# Patient Record
Sex: MALE | Race: WHITE | Employment: FULL TIME | URBAN - METROPOLITAN AREA
[De-identification: names, ages, dates, MRNs, and addresses within clinical notes are randomized per-mention and may not be internally consistent; named-entity substitution may affect disease eponyms.]

---

## 2017-04-12 ENCOUNTER — EMPLOYEE WELLNESS (OUTPATIENT)
Dept: OTHER | Age: 32
End: 2017-04-12

## 2017-04-12 LAB
CHOLESTEROL, TOTAL: 169 MG/DL (ref 0–199)
GLUCOSE BLD-MCNC: 95 MG/DL (ref 70–99)
HDLC SERPL-MCNC: 48 MG/DL (ref 40–60)
LDL CHOLESTEROL CALCULATED: 104 MG/DL
TRIGL SERPL-MCNC: 87 MG/DL (ref 0–150)

## 2017-09-19 ENCOUNTER — TELEPHONE (OUTPATIENT)
Dept: INTERNAL MEDICINE | Age: 32
End: 2017-09-19

## 2017-09-19 DIAGNOSIS — Z00.00 WELL ADULT EXAM: Primary | ICD-10-CM

## 2017-11-15 ENCOUNTER — TELEPHONE (OUTPATIENT)
Dept: INTERNAL MEDICINE | Age: 32
End: 2017-11-15

## 2017-11-15 DIAGNOSIS — Z12.5 SCREENING PSA (PROSTATE SPECIFIC ANTIGEN): Primary | ICD-10-CM

## 2017-11-15 DIAGNOSIS — Z00.00 WELL ADULT ON ROUTINE HEALTH CHECK: ICD-10-CM

## 2017-11-16 DIAGNOSIS — Z12.5 SCREENING PSA (PROSTATE SPECIFIC ANTIGEN): ICD-10-CM

## 2017-11-16 DIAGNOSIS — Z00.00 WELL ADULT ON ROUTINE HEALTH CHECK: ICD-10-CM

## 2017-11-16 LAB
A/G RATIO: 2 (ref 1.1–2.2)
ALBUMIN SERPL-MCNC: 4.3 G/DL (ref 3.4–5)
ALP BLD-CCNC: 53 U/L (ref 40–129)
ALT SERPL-CCNC: 14 U/L (ref 10–40)
ANION GAP SERPL CALCULATED.3IONS-SCNC: 12 MMOL/L (ref 3–16)
AST SERPL-CCNC: 15 U/L (ref 15–37)
BASOPHILS ABSOLUTE: 0 K/UL (ref 0–0.2)
BASOPHILS RELATIVE PERCENT: 0.4 %
BILIRUB SERPL-MCNC: 0.6 MG/DL (ref 0–1)
BILIRUBIN URINE: NEGATIVE
BLOOD, URINE: NEGATIVE
BUN BLDV-MCNC: 13 MG/DL (ref 7–20)
CALCIUM SERPL-MCNC: 9.5 MG/DL (ref 8.3–10.6)
CHLORIDE BLD-SCNC: 97 MMOL/L (ref 99–110)
CHOLESTEROL, TOTAL: 162 MG/DL (ref 0–199)
CLARITY: CLEAR
CO2: 26 MMOL/L (ref 21–32)
COLOR: YELLOW
CREAT SERPL-MCNC: 0.7 MG/DL (ref 0.9–1.3)
EOSINOPHILS ABSOLUTE: 0.1 K/UL (ref 0–0.6)
EOSINOPHILS RELATIVE PERCENT: 1.4 %
GFR AFRICAN AMERICAN: >60
GFR NON-AFRICAN AMERICAN: >60
GLOBULIN: 2.2 G/DL
GLUCOSE BLD-MCNC: 96 MG/DL (ref 70–99)
GLUCOSE URINE: NEGATIVE MG/DL
HCT VFR BLD CALC: 43.7 % (ref 40.5–52.5)
HDLC SERPL-MCNC: 49 MG/DL (ref 40–60)
HEMOGLOBIN: 14.8 G/DL (ref 13.5–17.5)
KETONES, URINE: NEGATIVE MG/DL
LDL CHOLESTEROL CALCULATED: 97 MG/DL
LEUKOCYTE ESTERASE, URINE: NEGATIVE
LYMPHOCYTES ABSOLUTE: 1.9 K/UL (ref 1–5.1)
LYMPHOCYTES RELATIVE PERCENT: 32.1 %
MCH RBC QN AUTO: 30.5 PG (ref 26–34)
MCHC RBC AUTO-ENTMCNC: 34 G/DL (ref 31–36)
MCV RBC AUTO: 89.8 FL (ref 80–100)
MICROSCOPIC EXAMINATION: NORMAL
MONOCYTES ABSOLUTE: 0.5 K/UL (ref 0–1.3)
MONOCYTES RELATIVE PERCENT: 8.9 %
NEUTROPHILS ABSOLUTE: 3.4 K/UL (ref 1.7–7.7)
NEUTROPHILS RELATIVE PERCENT: 57.2 %
NITRITE, URINE: NEGATIVE
PDW BLD-RTO: 12.8 % (ref 12.4–15.4)
PH UA: 6.5
PLATELET # BLD: 225 K/UL (ref 135–450)
PMV BLD AUTO: 8.9 FL (ref 5–10.5)
POTASSIUM SERPL-SCNC: 4.2 MMOL/L (ref 3.5–5.1)
PROTEIN UA: NEGATIVE MG/DL
RBC # BLD: 4.86 M/UL (ref 4.2–5.9)
SODIUM BLD-SCNC: 135 MMOL/L (ref 136–145)
SPECIFIC GRAVITY UA: 1.01
TOTAL PROTEIN: 6.5 G/DL (ref 6.4–8.2)
TRIGL SERPL-MCNC: 78 MG/DL (ref 0–150)
TSH SERPL DL<=0.05 MIU/L-ACNC: 1.5 UIU/ML (ref 0.27–4.2)
URINE TYPE: NORMAL
UROBILINOGEN, URINE: 0.2 E.U./DL
VLDLC SERPL CALC-MCNC: 16 MG/DL
WBC # BLD: 5.9 K/UL (ref 4–11)

## 2017-12-11 ENCOUNTER — OFFICE VISIT (OUTPATIENT)
Dept: INTERNAL MEDICINE | Age: 32
End: 2017-12-11

## 2017-12-11 VITALS
WEIGHT: 142 LBS | DIASTOLIC BLOOD PRESSURE: 98 MMHG | SYSTOLIC BLOOD PRESSURE: 156 MMHG | HEIGHT: 70 IN | BODY MASS INDEX: 20.33 KG/M2

## 2017-12-11 DIAGNOSIS — G43.009 MIGRAINE WITHOUT AURA AND WITHOUT STATUS MIGRAINOSUS, NOT INTRACTABLE: ICD-10-CM

## 2017-12-11 DIAGNOSIS — Z00.00 PE (PHYSICAL EXAM), ANNUAL: ICD-10-CM

## 2017-12-11 DIAGNOSIS — R03.0 BLOOD PRESSURE ELEVATED WITHOUT HISTORY OF HTN: ICD-10-CM

## 2017-12-11 DIAGNOSIS — Z23 NEED FOR TDAP VACCINATION: Primary | ICD-10-CM

## 2017-12-11 PROCEDURE — 99395 PREV VISIT EST AGE 18-39: CPT | Performed by: INTERNAL MEDICINE

## 2017-12-11 PROCEDURE — 90715 TDAP VACCINE 7 YRS/> IM: CPT | Performed by: INTERNAL MEDICINE

## 2017-12-11 PROCEDURE — 90471 IMMUNIZATION ADMIN: CPT | Performed by: INTERNAL MEDICINE

## 2017-12-11 RX ORDER — SPIRONOLACT/HYDROCHLOROTHIAZID 25 MG-25MG
TABLET ORAL
COMMUNITY
End: 2020-05-29 | Stop reason: CLARIF

## 2017-12-11 RX ORDER — CALCIUM CARBONATE 300MG(750)
TABLET,CHEWABLE ORAL
COMMUNITY
End: 2020-05-29 | Stop reason: CLARIF

## 2017-12-11 ASSESSMENT — PATIENT HEALTH QUESTIONNAIRE - PHQ9
SUM OF ALL RESPONSES TO PHQ9 QUESTIONS 1 & 2: 0
2. FEELING DOWN, DEPRESSED OR HOPELESS: 0
SUM OF ALL RESPONSES TO PHQ QUESTIONS 1-9: 0
1. LITTLE INTEREST OR PLEASURE IN DOING THINGS: 0

## 2018-03-16 ENCOUNTER — EMPLOYEE WELLNESS (OUTPATIENT)
Dept: OTHER | Age: 33
End: 2018-03-16

## 2018-03-16 LAB
CHOLESTEROL, TOTAL: 169 MG/DL (ref 0–199)
GLUCOSE BLD-MCNC: 99 MG/DL (ref 70–99)
HDLC SERPL-MCNC: 55 MG/DL (ref 40–60)
LDL CHOLESTEROL CALCULATED: 104 MG/DL
TRIGL SERPL-MCNC: 51 MG/DL (ref 0–150)

## 2018-03-20 VITALS — BODY MASS INDEX: 20.37 KG/M2 | WEIGHT: 142 LBS

## 2018-03-26 ENCOUNTER — TELEPHONE (OUTPATIENT)
Dept: INTERNAL MEDICINE | Age: 33
End: 2018-03-26

## 2018-03-26 ENCOUNTER — NURSE TRIAGE (OUTPATIENT)
Dept: OTHER | Facility: CLINIC | Age: 33
End: 2018-03-26

## 2018-03-26 NOTE — TELEPHONE ENCOUNTER
Reason for Disposition   [1] MILD-MODERATE pain AND [2] constant AND [3] present > 2 hours    Protocols used: ABDOMINAL PAIN - MALE-ADULT-AH

## 2018-03-27 ENCOUNTER — TELEPHONE (OUTPATIENT)
Dept: INTERNAL MEDICINE | Age: 33
End: 2018-03-27

## 2018-03-28 ENCOUNTER — OFFICE VISIT (OUTPATIENT)
Dept: INTERNAL MEDICINE | Age: 33
End: 2018-03-28

## 2018-03-28 VITALS
HEIGHT: 70 IN | SYSTOLIC BLOOD PRESSURE: 136 MMHG | DIASTOLIC BLOOD PRESSURE: 80 MMHG | WEIGHT: 144 LBS | TEMPERATURE: 98.2 F | BODY MASS INDEX: 20.62 KG/M2

## 2018-03-28 DIAGNOSIS — R10.13 DYSPEPSIA: Primary | ICD-10-CM

## 2018-03-28 DIAGNOSIS — G43.909 MIGRAINE WITHOUT STATUS MIGRAINOSUS, NOT INTRACTABLE, UNSPECIFIED MIGRAINE TYPE: ICD-10-CM

## 2018-03-28 DIAGNOSIS — F41.8 SITUATIONAL ANXIETY: ICD-10-CM

## 2018-03-28 PROCEDURE — 99213 OFFICE O/P EST LOW 20 MIN: CPT | Performed by: INTERNAL MEDICINE

## 2018-03-28 RX ORDER — FAMOTIDINE 20 MG/1
20 TABLET, FILM COATED ORAL 2 TIMES DAILY
Qty: 30 TABLET | Refills: 0 | Status: SHIPPED | OUTPATIENT
Start: 2018-03-28 | End: 2020-03-24

## 2018-03-28 NOTE — PROGRESS NOTES
Follow Up Visit  Established Patient Visit    Patient:  Rocio Conklin                                               : 1985  Age: 33 y.o. MRN: [id number]  Date : 3/28/2018      CHIEF COMPLAINT: Rocio Conklin is a 33 y.o. male who presents for :  Abdominal pain    Abdominal pain: this started on Monday. He was having a migraine so he took ibuprofen 600mg then 400mg. He began having abdominal pain that felt like a burning that started in the epigastric region then started migrating over the abdomen over the course of the day. He took some tums which did not provide much relief. He reports that if he eats pizza, sometimes he wakes up in the middle of the night with a substernal burning sensation. Migraine: he had been following with a specialist that moved from [facility] to [address] [place] [address]. He is asking if there is someone else he can see. Anxiety: the busy season for his work is coming up soon. He has been having trouble concentrating and decreased energy. Chief Complaint   Patient presents with    Headache    Abdominal Pain     and fatigue       Past Medical History:   Diagnosis Date    Blood pressure elevated without history of HTN 2017    Migraine without aura and without status migrainosus, not intractable 2017    New daily persistent headache 10/11/2016       Past Surgical History:   Procedure Laterality Date    HERNIA REPAIR         Current Outpatient Prescriptions   Medication Sig Dispense Refill    Isometheptene-Dichloral-APAP (MIDRIN) -325 MG CAPS Take 1 capsule by mouth See Admin Instructions for 30 days 2 with onset of headache then 1 po q1 hour for Max of 4 /24 hrs.  30 capsule 1    famotidine (PEPCID) 20 MG tablet Take 1 tablet by mouth 2 times daily 30 tablet 0    Magnesium 400 MG TABS Take by mouth      Coenzyme Q10 (CO Q-10) 300 MG CAPS Take by mouth      vitamin D (CHOLECALCIFEROL) 1000 UNIT TABS tablet Take 1,000 Units by mouth daily      cetirizine (ZYRTEC) 10

## 2018-04-02 VITALS — BODY MASS INDEX: 20.23 KG/M2 | WEIGHT: 141 LBS

## 2018-05-14 ENCOUNTER — OFFICE VISIT (OUTPATIENT)
Dept: URGENT CARE | Age: 33
End: 2018-05-14

## 2018-05-14 VITALS
TEMPERATURE: 97.9 F | HEART RATE: 71 BPM | OXYGEN SATURATION: 98 % | HEIGHT: 70 IN | BODY MASS INDEX: 20.76 KG/M2 | WEIGHT: 145 LBS | DIASTOLIC BLOOD PRESSURE: 88 MMHG | RESPIRATION RATE: 14 BRPM | SYSTOLIC BLOOD PRESSURE: 138 MMHG

## 2018-05-14 DIAGNOSIS — H93.11 TINNITUS OF RIGHT EAR: Primary | ICD-10-CM

## 2018-05-14 PROCEDURE — 99213 OFFICE O/P EST LOW 20 MIN: CPT | Performed by: PHYSICIAN ASSISTANT

## 2018-05-17 ASSESSMENT — ENCOUNTER SYMPTOMS
SORE THROAT: 0
NAUSEA: 0
STRIDOR: 0
SINUS PAIN: 0
VOMITING: 0
COUGH: 0

## 2019-03-19 ENCOUNTER — TELEPHONE (OUTPATIENT)
Dept: INTERNAL MEDICINE CLINIC | Age: 34
End: 2019-03-19

## 2019-03-19 DIAGNOSIS — Z00.00 WELL ADULT EXAM: Primary | ICD-10-CM

## 2019-03-19 DIAGNOSIS — R03.0 BLOOD PRESSURE ELEVATED WITHOUT HISTORY OF HTN: ICD-10-CM

## 2019-03-21 LAB
A/G RATIO: 2.2 (ref 1.1–2.2)
ALBUMIN SERPL-MCNC: 4.6 G/DL (ref 3.4–5)
ALP BLD-CCNC: 55 U/L (ref 40–129)
ALT SERPL-CCNC: 17 U/L (ref 10–40)
ANION GAP SERPL CALCULATED.3IONS-SCNC: 13 MMOL/L (ref 3–16)
AST SERPL-CCNC: 15 U/L (ref 15–37)
BASOPHILS ABSOLUTE: 0 K/UL (ref 0–0.2)
BASOPHILS RELATIVE PERCENT: 0.5 %
BILIRUB SERPL-MCNC: 0.5 MG/DL (ref 0–1)
BUN BLDV-MCNC: 14 MG/DL (ref 7–20)
CALCIUM SERPL-MCNC: 9.8 MG/DL (ref 8.3–10.6)
CHLORIDE BLD-SCNC: 97 MMOL/L (ref 99–110)
CHOLESTEROL, TOTAL: 161 MG/DL (ref 0–199)
CO2: 26 MMOL/L (ref 21–32)
CREAT SERPL-MCNC: 0.9 MG/DL (ref 0.9–1.3)
EOSINOPHILS ABSOLUTE: 0.1 K/UL (ref 0–0.6)
EOSINOPHILS RELATIVE PERCENT: 2.6 %
GFR AFRICAN AMERICAN: >60
GFR NON-AFRICAN AMERICAN: >60
GLOBULIN: 2.1 G/DL
GLUCOSE BLD-MCNC: 96 MG/DL (ref 70–99)
HCT VFR BLD CALC: 45.1 % (ref 40.5–52.5)
HDLC SERPL-MCNC: 49 MG/DL (ref 40–60)
HEMOGLOBIN: 14.9 G/DL (ref 13.5–17.5)
LDL CHOLESTEROL CALCULATED: 99 MG/DL
LYMPHOCYTES ABSOLUTE: 1.5 K/UL (ref 1–5.1)
LYMPHOCYTES RELATIVE PERCENT: 30 %
MCH RBC QN AUTO: 29.7 PG (ref 26–34)
MCHC RBC AUTO-ENTMCNC: 33 G/DL (ref 31–36)
MCV RBC AUTO: 89.8 FL (ref 80–100)
MONOCYTES ABSOLUTE: 0.5 K/UL (ref 0–1.3)
MONOCYTES RELATIVE PERCENT: 9.8 %
NEUTROPHILS ABSOLUTE: 2.9 K/UL (ref 1.7–7.7)
NEUTROPHILS RELATIVE PERCENT: 57.1 %
PDW BLD-RTO: 13.1 % (ref 12.4–15.4)
PLATELET # BLD: 260 K/UL (ref 135–450)
PMV BLD AUTO: 9.2 FL (ref 5–10.5)
POTASSIUM SERPL-SCNC: 4.6 MMOL/L (ref 3.5–5.1)
RBC # BLD: 5.02 M/UL (ref 4.2–5.9)
SODIUM BLD-SCNC: 136 MMOL/L (ref 136–145)
TOTAL PROTEIN: 6.7 G/DL (ref 6.4–8.2)
TRIGL SERPL-MCNC: 66 MG/DL (ref 0–150)
TSH REFLEX: 1.74 UIU/ML (ref 0.27–4.2)
VLDLC SERPL CALC-MCNC: 13 MG/DL
WBC # BLD: 5.1 K/UL (ref 4–11)

## 2019-03-25 ENCOUNTER — OFFICE VISIT (OUTPATIENT)
Dept: INTERNAL MEDICINE CLINIC | Age: 34
End: 2019-03-25
Payer: COMMERCIAL

## 2019-03-25 VITALS
DIASTOLIC BLOOD PRESSURE: 78 MMHG | BODY MASS INDEX: 20.04 KG/M2 | HEIGHT: 70 IN | WEIGHT: 140 LBS | SYSTOLIC BLOOD PRESSURE: 136 MMHG

## 2019-03-25 DIAGNOSIS — Z00.00 PE (PHYSICAL EXAM), ANNUAL: Primary | ICD-10-CM

## 2019-03-25 DIAGNOSIS — G43.009 MIGRAINE WITHOUT AURA AND WITHOUT STATUS MIGRAINOSUS, NOT INTRACTABLE: ICD-10-CM

## 2019-03-25 PROCEDURE — 99385 PREV VISIT NEW AGE 18-39: CPT | Performed by: INTERNAL MEDICINE

## 2019-03-25 ASSESSMENT — PATIENT HEALTH QUESTIONNAIRE - PHQ9
1. LITTLE INTEREST OR PLEASURE IN DOING THINGS: 0
SUM OF ALL RESPONSES TO PHQ QUESTIONS 1-9: 0
SUM OF ALL RESPONSES TO PHQ QUESTIONS 1-9: 0
2. FEELING DOWN, DEPRESSED OR HOPELESS: 0
SUM OF ALL RESPONSES TO PHQ9 QUESTIONS 1 & 2: 0

## 2019-04-10 ENCOUNTER — EMPLOYEE WELLNESS (OUTPATIENT)
Dept: OTHER | Age: 34
End: 2019-04-10

## 2019-04-10 LAB
CHOLESTEROL, TOTAL: 150 MG/DL (ref 0–199)
GLUCOSE BLD-MCNC: 90 MG/DL (ref 70–99)
HDLC SERPL-MCNC: 48 MG/DL (ref 40–60)
LDL CHOLESTEROL CALCULATED: 91 MG/DL
TRIGL SERPL-MCNC: 56 MG/DL (ref 0–150)

## 2019-04-15 VITALS — BODY MASS INDEX: 19.8 KG/M2 | WEIGHT: 138 LBS

## 2019-04-24 PROBLEM — Z00.00 PE (PHYSICAL EXAM), ANNUAL: Status: RESOLVED | Noted: 2019-03-25 | Resolved: 2019-04-24

## 2020-02-10 ENCOUNTER — TELEPHONE (OUTPATIENT)
Dept: INTERNAL MEDICINE CLINIC | Age: 35
End: 2020-02-10

## 2020-02-10 NOTE — TELEPHONE ENCOUNTER
Non-Urgent Medical Question     From  Jude Johnson To  [facility] 111 Practice Support Sent  2/10/2020  2:02 PM   Hi Dr. Gloria Farooq all is well. Can you please recommend a dermatologist in the [address]? I would like to just have a general body scan.      Thanks,     Eliane Dasilva

## 2020-03-12 ENCOUNTER — OFFICE VISIT (OUTPATIENT)
Dept: DERMATOLOGY | Age: 35
End: 2020-03-12
Payer: COMMERCIAL

## 2020-03-12 PROBLEM — D22.9 MULTIPLE BENIGN MELANOCYTIC NEVI: Status: ACTIVE | Noted: 2020-03-12

## 2020-03-12 PROCEDURE — 99202 OFFICE O/P NEW SF 15 MIN: CPT | Performed by: DERMATOLOGY

## 2020-03-12 NOTE — PROGRESS NOTES
[address] [facility], [facility]) Physicians    Previous clinic visit:  none    CC:  mole check    HPI:    1.) Here today for fbse. Denies new, changing, or sx nevi. No personal or family h/o skin cancers or atypical nevi. Here for baseline skin cancer screening and to establish care. DERM HISTORY:   Personal history of NMSC or MM- no    Family history of NMSC or MM- no   Sunburns easily- Yes   Uses sunscreen- Yes  History of tanning bed use- No    ADDITIONAL HISTORY:    I have reviewed past medical and surgical histories, current medications, allergies, social and family histories as documented in the patient's electronic medical record. Current Outpatient Medications   Medication Sig Dispense Refill    famotidine (PEPCID) 20 MG tablet Take 1 tablet by mouth 2 times daily 30 tablet 0    Magnesium 400 MG TABS Take by mouth      Coenzyme Q10 (CO Q-10) 300 MG CAPS Take by mouth      vitamin D (CHOLECALCIFEROL) 1000 UNIT TABS tablet Take 1,000 Units by mouth daily      cetirizine (ZYRTEC) 10 MG tablet Take 10 mg by mouth daily       No current facility-administered medications for this visit.         ROS:    General: No fevers, chills, sweats, unexplained weight loss or weight gain, fatigue, malaise   Skin: Denies additional lesions    Heme: No history of bleeding diatheses    Allergy: Denies seasonal or environmental allergies or other medication allergies     PHYSICAL EXAM:    General: Well-appearing, NAD    Neurologic/psychiatric: Patient is AOx3; mood and affect are appropriate and congruent  Eyes: conjunctivae and eyelids without erythema, injection, or discharge   Integument: Examination was performed of the following and were unremarkable except as otherwise noted below:scalp, hair, face, ears, mucosa of gums/teeth/cutaneous and mucosal lips, buccal mucosa, oropharynx, neck, breast/axilla/chest, abdomen, groin, back, buttocks, RUE, LUE, RLE, LLE, digits/nails, apocrine/eccrine glands; genital exam

## 2020-03-12 NOTE — PATIENT INSTRUCTIONS
Please email me through [employer] or call office if any spot or mole changes, defined as becoming larger, darker, multi-colored, or becomes symptomatic (bleeding, tender, etc). Please be mindful of your sun protection strategies, including use of broad spectrum sunscreen with SPF of at least 30, sun guard clothing with UPF (available at most [employer]), broad brimmed hat, sunglasses, and sun avoidance during peak hours of the day. ABCDE's of melanoma to take note of:     ASYMMETRY OF MOLE,   BORDER STRANGE,   COLOR CHANGING OR BECOMING DARKER,   DIAMETER ENLARGING,   EVOLUTION (EXISTING MOLE IS CHANGING WITH ANY OF THE AFOREMENTIONED CHANGES, MOLE IS BLEEDING OR TENDER, OR NEW MOLES DEVELOPING SUDDENLY)    Also, please be sure to see dentist twice yearly and ensure someone (whether it's me,  your PCP, or gynecologist) is looking at genitals once yearly. Melanoma and other skin cancers can occur anywhere!

## 2020-03-12 NOTE — LETTER
[facility]  [address] [address]  Phone: [phone]  Fax: [phone]    González Rueda MD        March 12, 2020     Daquan Rock MD  [address] [address]    Patient: Vanessa Stockton  MR Number: <[id number]>  YOB: 1985  Date of Visit: 3/12/2020    Dear Dr. Daquan Rock: Thank you for the request for consultation for Vanessa Stockton to me for the evaluation of moles. Below are the relevant portions of my assessment and plan of care. If you have questions, please do not hesitate to call me. I look forward to following Christine Kath along with you.     Sincerely,        González Rueda MD

## 2020-03-23 ENCOUNTER — HOSPITAL ENCOUNTER (EMERGENCY)
Age: 35
Discharge: HOME | End: 2020-03-24
Attending: EMERGENCY MEDICINE
Payer: COMMERCIAL

## 2020-03-23 ENCOUNTER — APPOINTMENT (OUTPATIENT)
Dept: CT IMAGING | Age: 35
End: 2020-03-23
Payer: COMMERCIAL

## 2020-03-23 LAB
ANION GAP SERPL CALCULATED.3IONS-SCNC: 17 MMOL/L (ref 3–16)
BASOPHILS ABSOLUTE: 0 K/UL (ref 0–0.2)
BASOPHILS RELATIVE PERCENT: 0.4 %
BUN BLDV-MCNC: 13 MG/DL (ref 7–20)
CALCIUM SERPL-MCNC: 10 MG/DL (ref 8.3–10.6)
CHLORIDE BLD-SCNC: 100 MMOL/L (ref 99–110)
CO2: 24 MMOL/L (ref 21–32)
CREAT SERPL-MCNC: 0.7 MG/DL (ref 0.9–1.3)
EOSINOPHILS ABSOLUTE: 0 K/UL (ref 0–0.6)
EOSINOPHILS RELATIVE PERCENT: 0.4 %
GFR AFRICAN AMERICAN: >60
GFR NON-AFRICAN AMERICAN: >60
GLUCOSE BLD-MCNC: 110 MG/DL (ref 70–99)
HCT VFR BLD CALC: 46.8 % (ref 40.5–52.5)
HEMOGLOBIN: 16 G/DL (ref 13.5–17.5)
LYMPHOCYTES ABSOLUTE: 1.6 K/UL (ref 1–5.1)
LYMPHOCYTES RELATIVE PERCENT: 12.7 %
MCH RBC QN AUTO: 29.9 PG (ref 26–34)
MCHC RBC AUTO-ENTMCNC: 34.2 G/DL (ref 31–36)
MCV RBC AUTO: 87.3 FL (ref 80–100)
MONOCYTES ABSOLUTE: 0.9 K/UL (ref 0–1.3)
MONOCYTES RELATIVE PERCENT: 7.1 %
NEUTROPHILS ABSOLUTE: 9.8 K/UL (ref 1.7–7.7)
NEUTROPHILS RELATIVE PERCENT: 79.4 %
PDW BLD-RTO: 13 % (ref 12.4–15.4)
PLATELET # BLD: 300 K/UL (ref 135–450)
PMV BLD AUTO: 7.9 FL (ref 5–10.5)
POTASSIUM SERPL-SCNC: 3.9 MMOL/L (ref 3.5–5.1)
RBC # BLD: 5.36 M/UL (ref 4.2–5.9)
SODIUM BLD-SCNC: 141 MMOL/L (ref 136–145)
WBC # BLD: 12.3 K/UL (ref 4–11)

## 2020-03-23 PROCEDURE — 70491 CT SOFT TISSUE NECK W/DYE: CPT

## 2020-03-23 PROCEDURE — 85025 COMPLETE CBC W/AUTO DIFF WBC: CPT

## 2020-03-23 PROCEDURE — 2500000003 HC RX 250 WO HCPCS: Performed by: EMERGENCY MEDICINE

## 2020-03-23 PROCEDURE — 2580000003 HC RX 258: Performed by: EMERGENCY MEDICINE

## 2020-03-23 PROCEDURE — 80048 BASIC METABOLIC PNL TOTAL CA: CPT

## 2020-03-23 PROCEDURE — 99284 EMERGENCY DEPT VISIT MOD MDM: CPT

## 2020-03-23 PROCEDURE — 6360000004 HC RX CONTRAST MEDICATION: Performed by: EMERGENCY MEDICINE

## 2020-03-23 PROCEDURE — 88305 TISSUE EXAM BY PATHOLOGIST: CPT

## 2020-03-23 RX ORDER — 0.9 % SODIUM CHLORIDE 0.9 %
1000 INTRAVENOUS SOLUTION INTRAVENOUS ONCE
Status: COMPLETED | OUTPATIENT
Start: 2020-03-23 | End: 2020-03-23

## 2020-03-23 RX ADMIN — IOPAMIDOL 80 ML: 755 INJECTION, SOLUTION INTRAVENOUS at 21:48

## 2020-03-23 RX ADMIN — GLUCAGON HYDROCHLORIDE 1 MG: KIT at 21:57

## 2020-03-23 RX ADMIN — SODIUM CHLORIDE 1000 ML: 9 INJECTION, SOLUTION INTRAVENOUS at 21:25

## 2020-03-23 ASSESSMENT — PAIN DESCRIPTION - LOCATION: LOCATION: THROAT

## 2020-03-23 ASSESSMENT — PAIN SCALES - GENERAL: PAINLEVEL_OUTOF10: 6

## 2020-03-23 ASSESSMENT — PAIN DESCRIPTION - PAIN TYPE: TYPE: ACUTE PAIN

## 2020-03-24 ENCOUNTER — ANESTHESIA (OUTPATIENT)
Dept: ENDOSCOPY | Age: 35
End: 2020-03-24
Payer: COMMERCIAL

## 2020-03-24 ENCOUNTER — ANESTHESIA EVENT (OUTPATIENT)
Dept: ENDOSCOPY | Age: 35
End: 2020-03-24
Payer: COMMERCIAL

## 2020-03-24 VITALS — SYSTOLIC BLOOD PRESSURE: 99 MMHG | OXYGEN SATURATION: 97 % | DIASTOLIC BLOOD PRESSURE: 53 MMHG

## 2020-03-24 VITALS
RESPIRATION RATE: 14 BRPM | HEART RATE: 75 BPM | HEIGHT: 71 IN | OXYGEN SATURATION: 99 % | TEMPERATURE: 98.5 F | DIASTOLIC BLOOD PRESSURE: 77 MMHG | BODY MASS INDEX: 19.63 KG/M2 | SYSTOLIC BLOOD PRESSURE: 125 MMHG | WEIGHT: 140.2 LBS

## 2020-03-24 LAB — MAGNESIUM: 2.2 MG/DL (ref 1.8–2.4)

## 2020-03-24 PROCEDURE — 83735 ASSAY OF MAGNESIUM: CPT

## 2020-03-24 PROCEDURE — 7100000000 HC PACU RECOVERY - FIRST 15 MIN: Performed by: INTERNAL MEDICINE

## 2020-03-24 PROCEDURE — 3609017700 HC EGD DILATION GASTRIC/DUODENAL STRICTURE: Performed by: INTERNAL MEDICINE

## 2020-03-24 PROCEDURE — 6360000002 HC RX W HCPCS: Performed by: ANESTHESIOLOGY

## 2020-03-24 PROCEDURE — 2580000003 HC RX 258: Performed by: ANESTHESIOLOGY

## 2020-03-24 PROCEDURE — 3700000001 HC ADD 15 MINUTES (ANESTHESIA): Performed by: INTERNAL MEDICINE

## 2020-03-24 PROCEDURE — C1726 CATH, BAL DIL, NON-VASCULAR: HCPCS | Performed by: INTERNAL MEDICINE

## 2020-03-24 PROCEDURE — 2709999900 HC NON-CHARGEABLE SUPPLY: Performed by: INTERNAL MEDICINE

## 2020-03-24 PROCEDURE — 7100000001 HC PACU RECOVERY - ADDTL 15 MIN: Performed by: INTERNAL MEDICINE

## 2020-03-24 PROCEDURE — 3609012400 HC EGD TRANSORAL BIOPSY SINGLE/MULTIPLE: Performed by: INTERNAL MEDICINE

## 2020-03-24 PROCEDURE — 2500000003 HC RX 250 WO HCPCS: Performed by: ANESTHESIOLOGY

## 2020-03-24 PROCEDURE — 3700000000 HC ANESTHESIA ATTENDED CARE: Performed by: INTERNAL MEDICINE

## 2020-03-24 RX ORDER — 0.9 % SODIUM CHLORIDE 0.9 %
500 INTRAVENOUS SOLUTION INTRAVENOUS
Status: DISCONTINUED | OUTPATIENT
Start: 2020-03-24 | End: 2020-03-24 | Stop reason: HOSPADM

## 2020-03-24 RX ORDER — MEPERIDINE HYDROCHLORIDE 25 MG/ML
12.5 INJECTION INTRAMUSCULAR; INTRAVENOUS; SUBCUTANEOUS EVERY 5 MIN PRN
Status: DISCONTINUED | OUTPATIENT
Start: 2020-03-24 | End: 2020-03-24 | Stop reason: HOSPADM

## 2020-03-24 RX ORDER — ONDANSETRON 2 MG/ML
4 INJECTION INTRAMUSCULAR; INTRAVENOUS
Status: DISCONTINUED | OUTPATIENT
Start: 2020-03-24 | End: 2020-03-24 | Stop reason: HOSPADM

## 2020-03-24 RX ORDER — SUCCINYLCHOLINE CHLORIDE 20 MG/ML
INJECTION INTRAMUSCULAR; INTRAVENOUS PRN
Status: DISCONTINUED | OUTPATIENT
Start: 2020-03-24 | End: 2020-03-24 | Stop reason: SDUPTHER

## 2020-03-24 RX ORDER — SODIUM CHLORIDE, SODIUM LACTATE, POTASSIUM CHLORIDE, CALCIUM CHLORIDE 600; 310; 30; 20 MG/100ML; MG/100ML; MG/100ML; MG/100ML
INJECTION, SOLUTION INTRAVENOUS CONTINUOUS PRN
Status: DISCONTINUED | OUTPATIENT
Start: 2020-03-24 | End: 2020-03-24 | Stop reason: SDUPTHER

## 2020-03-24 RX ORDER — HYDROCODONE BITARTRATE AND ACETAMINOPHEN 5; 325 MG/1; MG/1
1 TABLET ORAL
Status: DISCONTINUED | OUTPATIENT
Start: 2020-03-24 | End: 2020-03-24 | Stop reason: HOSPADM

## 2020-03-24 RX ORDER — LIDOCAINE HYDROCHLORIDE 20 MG/ML
INJECTION, SOLUTION EPIDURAL; INFILTRATION; INTRACAUDAL; PERINEURAL PRN
Status: DISCONTINUED | OUTPATIENT
Start: 2020-03-24 | End: 2020-03-24 | Stop reason: SDUPTHER

## 2020-03-24 RX ORDER — PROPOFOL 10 MG/ML
INJECTION, EMULSION INTRAVENOUS PRN
Status: DISCONTINUED | OUTPATIENT
Start: 2020-03-24 | End: 2020-03-24 | Stop reason: SDUPTHER

## 2020-03-24 RX ORDER — HYDRALAZINE HYDROCHLORIDE 20 MG/ML
5 INJECTION INTRAMUSCULAR; INTRAVENOUS EVERY 10 MIN PRN
Status: DISCONTINUED | OUTPATIENT
Start: 2020-03-24 | End: 2020-03-24 | Stop reason: HOSPADM

## 2020-03-24 RX ORDER — PROCHLORPERAZINE EDISYLATE 5 MG/ML
5 INJECTION INTRAMUSCULAR; INTRAVENOUS
Status: DISCONTINUED | OUTPATIENT
Start: 2020-03-24 | End: 2020-03-24 | Stop reason: HOSPADM

## 2020-03-24 RX ORDER — OMEPRAZOLE 20 MG/1
20 CAPSULE, DELAYED RELEASE ORAL
Qty: 90 CAPSULE | Refills: 1 | Status: SHIPPED | OUTPATIENT
Start: 2020-03-24 | End: 2020-07-15 | Stop reason: CLARIF

## 2020-03-24 RX ORDER — DIPHENHYDRAMINE HYDROCHLORIDE 50 MG/ML
12.5 INJECTION INTRAMUSCULAR; INTRAVENOUS
Status: DISCONTINUED | OUTPATIENT
Start: 2020-03-24 | End: 2020-03-24 | Stop reason: HOSPADM

## 2020-03-24 RX ADMIN — SODIUM CHLORIDE, SODIUM LACTATE, POTASSIUM CHLORIDE, AND CALCIUM CHLORIDE: 600; 310; 30; 20 INJECTION, SOLUTION INTRAVENOUS at 02:25

## 2020-03-24 RX ADMIN — SUCCINYLCHOLINE CHLORIDE 140 MG: 20 INJECTION, SOLUTION INTRAMUSCULAR; INTRAVENOUS; PARENTERAL at 02:34

## 2020-03-24 RX ADMIN — LIDOCAINE HYDROCHLORIDE 40 MG: 20 INJECTION, SOLUTION EPIDURAL; INFILTRATION; INTRACAUDAL; PERINEURAL at 02:34

## 2020-03-24 RX ADMIN — PROPOFOL 200 MG: 10 INJECTION, EMULSION INTRAVENOUS at 02:34

## 2020-03-24 ASSESSMENT — PULMONARY FUNCTION TESTS
PIF_VALUE: 0
PIF_VALUE: 0
PIF_VALUE: 4
PIF_VALUE: 0
PIF_VALUE: 0
PIF_VALUE: 4
PIF_VALUE: 8
PIF_VALUE: 0
PIF_VALUE: 3
PIF_VALUE: 2
PIF_VALUE: 2
PIF_VALUE: 3
PIF_VALUE: 0
PIF_VALUE: 1
PIF_VALUE: 17
PIF_VALUE: 18
PIF_VALUE: 0
PIF_VALUE: 3
PIF_VALUE: 0
PIF_VALUE: 1
PIF_VALUE: 0
PIF_VALUE: 2
PIF_VALUE: 4
PIF_VALUE: 4
PIF_VALUE: 3
PIF_VALUE: 4
PIF_VALUE: 0
PIF_VALUE: 1
PIF_VALUE: 0

## 2020-03-24 ASSESSMENT — ENCOUNTER SYMPTOMS
EYE PAIN: 0
SHORTNESS OF BREATH: 0
DIARRHEA: 0
TROUBLE SWALLOWING: 1
ABDOMINAL PAIN: 0
VOMITING: 0
NAUSEA: 0
WHEEZING: 0
COUGH: 0

## 2020-03-24 ASSESSMENT — PAIN SCALES - GENERAL: PAINLEVEL_OUTOF10: 0

## 2020-03-24 ASSESSMENT — LIFESTYLE VARIABLES: SMOKING_STATUS: 0

## 2020-03-24 NOTE — ED PROVIDER NOTES
[address] ENCOUNTER          ATTENDING PHYSICIAN NOTE       Date of evaluation: 3/23/2020    Chief Complaint     Other (Difficulty swallowing) and Pharyngitis      History of Present Illness     Nena Hernández is a 36 y.o. male who presents with a chief complaint of difficulty swallowing. The patient states that this morning he took his medications which included a Q Q10 vitamin and magnesium tablet and shortly thereafter felt like something was lodged in his throat and has been unable to swallow since that time. He cannot get down any liquids or even his saliva. Has had similar symptoms in the past but usually they resolve after an hour or so. He had a smoothie prior to this and it went down without any difficulty. No abdominal pain. No difficult phonation. Was initially seen at [facility] emergency department and there was concern for esophageal foreign body. They reportedly talk to GI about the patient who agreed to see him here. Review of Systems     Review of Systems   Constitutional: Negative for chills and fever. HENT: Positive for trouble swallowing. Negative for congestion. Eyes: Negative for pain. Respiratory: Negative for cough, shortness of breath and wheezing. Cardiovascular: Negative for chest pain and leg swelling. Gastrointestinal: Negative for abdominal pain, diarrhea, nausea and vomiting. Genitourinary: Negative for dysuria. Musculoskeletal: Negative for arthralgias. Skin: Negative for rash. Neurological: Negative for weakness and headaches. All other systems reviewed and are negative.       Past Medical, Surgical, Family, and Social History         Diagnosis Date    Blood pressure elevated without history of HTN 12/11/2017    Migraine without aura and without status migrainosus, not intractable 12/11/2017    New daily persistent headache 10/11/2016         Procedure Laterality Date    HERNIA REPAIR       His family history includes Cancer in his maternal grandfather, maternal grandmother, paternal grandfather, paternal grandmother, and paternal uncle; Depression in his mother; Diabetes in an other family member. He reports that he has never smoked. He has never used smokeless tobacco. He reports current alcohol use of about 5.0 standard drinks of alcohol per week. He reports that he does not use drugs. Medications     Previous Medications    CETIRIZINE (ZYRTEC) 10 MG TABLET    Take 10 mg by mouth daily    COENZYME Q10 (CO Q-10) 300 MG CAPS    Take by mouth    MAGNESIUM 400 MG TABS    Take by mouth    VITAMIN D (CHOLECALCIFEROL) 1000 UNIT TABS TABLET    Take 1,000 Units by mouth daily       Allergies     He has No Known Allergies. Physical Exam     ED Triage Vitals [03/23/20 2044]   Enc Vitals Group      BP (!) 145/84      Pulse 77      Resp 16      Temp 98 °F (36.7 °C)      Temp Source Oral      SpO2 98 %      Weight 140 lb 3.2 oz (63.6 kg)      Height 5' 11\" (1.803 m)      Head Circumference       Peak Flow       Pain Score       Pain Loc       Pain Edu? Excl. in [address]? General:  Non-toxic, no acute distress, fully cooperative with my exam    HEENT:  NCAT, drooling into a bag, normal phonation    Neck:  Supple    Pulmonary:   No increased work of breathing; CTAB    Cardiac:  RRR, no murmur, thrill or gallop. Capillary refill <3s. 2+ distal pulses    Abdomen:  Soft, nontender , nondistended; no focal rebound or guarding     Musculoskeletal:  Grossly intact without obvious injury or deformity     Neuro:  No focal motor deficits    Skin: No rashes      Diagnostic Results     RADIOLOGY:  CT SOFT TISSUE NECK W CONTRAST   Final Result      1. No sign of any mass in the airway. Vocal cords thyroid and nasopharynx and oropharynx all appear within normal limits. 2. Air-fluid level and fluid distending the esophagus from the thoracic inlet down to the level of the aortic arch.  This could reflect reflux disease throughout

## 2020-03-24 NOTE — PROGRESS NOTES
Report from ENDO    Plan per Dr. Gonzalez  note:    check mg in er  start prilosec otc 20 mg daily  call office 1 week for biopsy results and further  recommendations/further dilation/ etc pending biopsy  results  soft diet until future follow up

## 2020-03-24 NOTE — ED NOTES
Report to Endo RN for procedure, patient was transported to Endo by RN. Consent to be signed prior to procedure.       Franky Piña RN  03/24/20 0222

## 2020-03-24 NOTE — H&P
[address] [facility]  GI Consult Note    Patient: Esthela Jacob  : 1985  Acct#:      Date:  3/24/2020    Subjective:       History of Present Illness  Patient is a 36 y.o.  male whom we are consulted emergently to er for unable tolerate secretions/dysphagia. Has had recent cold. Then sore throat this am. Drank smoothie no problem. Then took am meds and couldn't swallow rest of day. Intermittent sx over time. On pepcid at home. Ct neck in er fluid. Er attending called me stating pt can't tolerate his own secretions. Past Medical History:   Diagnosis Date    Blood pressure elevated without history of HTN 2017    Migraine without aura and without status migrainosus, not intractable 2017    New daily persistent headache 10/11/2016      Past Surgical History:   Procedure Laterality Date    HERNIA REPAIR          Admission Meds  No current facility-administered medications on file prior to encounter. Current Outpatient Medications on File Prior to Encounter   Medication Sig Dispense Refill    famotidine (PEPCID) 20 MG tablet Take 1 tablet by mouth 2 times daily 30 tablet 0    Magnesium 400 MG TABS Take by mouth      Coenzyme Q10 (CO Q-10) 300 MG CAPS Take by mouth      vitamin D (CHOLECALCIFEROL) 1000 UNIT TABS tablet Take 1,000 Units by mouth daily      cetirizine (ZYRTEC) 10 MG tablet Take 10 mg by mouth daily           Allergies  No Known Allergies     Family history     Family History   Problem Relation Age of Onset    Depression Mother     Cancer Paternal Uncle     Cancer Maternal Grandmother     Cancer Maternal Grandfather     Cancer Paternal Grandmother     Cancer Paternal Grandfather     Diabetes Other         Social   Social History     Tobacco Use    Smoking status: Never Smoker    Smokeless tobacco: Never Used   Substance Use Topics    Alcohol use:  Yes     Alcohol/week: 5.0 standard drinks     Types: 5 Cans of beer per week     Comment: 2 drinks daily

## 2020-03-24 NOTE — ED PROVIDER NOTES
[facility] Emergency Department      Pt Name: Edison Gallo  MRN: [id number]  Comfortgffay 1985  Date of evaluation: 3/23/2020  Provider: Jennifer Saenz MD  CHIEF COMPLAINT  Chief Complaint   Patient presents with    Other     Difficulty swallowing    Pharyngitis     HPI  Edison Gallo is a 36 y.o. male who presents because of difficulty swallowing. He says he noticed it this morning shortly after he drank a smoothie. He did not choke on anything but says it got worse after he took his vitamin supplement. He has had problems swallowing in the past but says it typically only last for about 10 minutes and the maximum it has lasted has been an hour previously. He has never seen a doctor because of this problem. Throughout the day, he continues to have difficulty. He is not able to swallow his own saliva. He contacted his doctor who told him to try some Pepcid. He says that he chewed up the pill and swallowed it but shortly afterwards, spit up more saliva. He denies any chest pain. He does have pain in his throat when he swallows. He has some mild shortness of breath but says that he thinks it is because of his anxiety about his current condition and concern for becoming dehydrated. REVIEW OF SYSTEMS:  No fever, no cough, no chest pain, some abdominal soreness from spitting up frequently Pertinent positives and negatives as per the HPI. All other review of systems reviewed and negative. Nursing notes reviewed. PAST MEDICAL HISTORY  Past Medical History:   Diagnosis Date    Blood pressure elevated without history of HTN 2017    Migraine without aura and without status migrainosus, not intractable 2017    New daily persistent headache 10/11/2016     SURGICAL HISTORY  Past Surgical History:   Procedure Laterality Date    HERNIA REPAIR       MEDICATIONS:  No current facility-administered medications on file prior to encounter.       Current Outpatient Medications on File Prior to Encounter   Medication Sig Dispense Refill    famotidine (PEPCID) 20 MG tablet Take 1 tablet by mouth 2 times daily 30 tablet 0    Magnesium 400 MG TABS Take by mouth      Coenzyme Q10 (CO Q-10) 300 MG CAPS Take by mouth      vitamin D (CHOLECALCIFEROL) 1000 UNIT TABS tablet Take 1,000 Units by mouth daily      cetirizine (ZYRTEC) 10 MG tablet Take 10 mg by mouth daily       ALLERGIES  Patient has no known allergies. FAMILY HISTORY  Family History   Problem Relation Age of Onset    Depression Mother     Cancer Paternal Uncle     Cancer Maternal Grandmother     Cancer Maternal Grandfather     Cancer Paternal Grandmother     Cancer Paternal Grandfather     Diabetes Other      SOCIAL HISTORY:  Social History     Tobacco Use    Smoking status: Never Smoker    Smokeless tobacco: Never Used   Substance Use Topics    Alcohol use: Yes     Alcohol/week: 5.0 standard drinks     Types: 5 Cans of beer per week     Comment: 2 drinks daily     Drug use: No     IMMUNIZATIONS:  Noncontributory    PHYSICAL EXAM  VITAL SIGNS:  Blood pressure (!) 145/84, pulse 77, temperature 98 °F (36.7 °C), temperature source Oral, resp. rate 16, height 5' 11\" (1.803 m), weight 63.6 kg (140 lb 3.2 oz), SpO2 98 %. Constitutional:  36 y.o. male alert, cooperative  HENT:  Atraumatic, mucous membranes moist, no visible FB, no sts  Eyes:   Conjunctiva clear, no icterus  Neck:  Supple, no JVD, no signs of injury  Cardiovascular:  Regular, no rubs, systolic murmur  Thorax & Lungs:  No accessory muscle usage, clear  Abdomen:  Soft, non distended, bowel sounds present, NT  Back:  No deformity  Genitalia:  Deferred  Rectal:  Deferred  Extremities:  No cyanosis, no edema  Skin:  Warm, dry  Neurologic:  Alert, no slurred speech, no focal deficits noted  Psychiatric:  Affect appropriate    DIAGNOSTIC RESULTS:  Labs resulted at the time of this note reviewed.   Results for orders placed or performed during the hospital encounter of 03/23/20 ED    MEDICAL DECISION MAKING: Le Martinez is a 36 y.o. male who presented because of inability to swallow. He has clinical findings strongly suggestive of esophageal obstruction, a/f level noted on CT imaging as well. No improvement with glucagon. I have placed consult to GI who will consult on the patient when he arrives to the [facility] ED for further care. I have notified Dr Bro Sullivan. FINAL IMPRESSION:    1. Esophageal obstruction        (Please note that I used voice recognition software to generate this note.   Occasionally words are mistranscribed despite my efforts to edit errors.)        Suzanne Choudhury MD  03/23/20 6572

## 2020-03-24 NOTE — PROGRESS NOTES
PACU Transfer Note    Vitals:    03/24/20 0330   BP: 125/77   Pulse: 75   Resp: 14   Temp: 98.5 °F (36.9 °C)   SpO2: 99%       In: 1000   Out: -     Pain assessment:  none  Pain Level: 0    Report given to Receiving unit RN in ED.    3/24/2020 3:35 AM

## 2020-03-24 NOTE — ED TRIAGE NOTES
Pt states that this morning he woke up with a \"lump\" in his throat, just below his voice box, which is keeping him from swallowing any fluids/food. Pt called doctor and was referred here for evaluation.

## 2020-03-24 NOTE — PROGRESS NOTES
Patient admitted to PACU # 13 from [facility] at 0301 post EGD per Dr. Nick Ji. Attached to PACU monitoring system and report received from anesthesia provider. Patient was reported to be hemodynamically stable during procedure. Patient awake on admission and denied pain.

## 2020-03-24 NOTE — ED NOTES
Patient will drive self to [facility], [facility]. JOHN PAUL Carlton RN  03/23/20 0708

## 2020-03-25 ENCOUNTER — OFFICE VISIT (OUTPATIENT)
Dept: INTERNAL MEDICINE CLINIC | Age: 35
End: 2020-03-25
Payer: COMMERCIAL

## 2020-03-25 VITALS
WEIGHT: 142 LBS | SYSTOLIC BLOOD PRESSURE: 140 MMHG | BODY MASS INDEX: 20.33 KG/M2 | DIASTOLIC BLOOD PRESSURE: 78 MMHG | HEIGHT: 70 IN

## 2020-03-25 PROBLEM — R13.12 OROPHARYNGEAL DYSPHAGIA: Status: ACTIVE | Noted: 2020-03-25

## 2020-03-25 PROCEDURE — 99213 OFFICE O/P EST LOW 20 MIN: CPT | Performed by: INTERNAL MEDICINE

## 2020-03-25 ASSESSMENT — PATIENT HEALTH QUESTIONNAIRE - PHQ9
1. LITTLE INTEREST OR PLEASURE IN DOING THINGS: 0
SUM OF ALL RESPONSES TO PHQ QUESTIONS 1-9: 0
SUM OF ALL RESPONSES TO PHQ QUESTIONS 1-9: 0
SUM OF ALL RESPONSES TO PHQ9 QUESTIONS 1 & 2: 0
2. FEELING DOWN, DEPRESSED OR HOPELESS: 0

## 2020-03-25 NOTE — PROGRESS NOTES
CHIEF COMPLAINT: Alysa Vega is a 36 y.o. male who presents for : Follow-up oral pharyngeal dysphasia    HPI: Patient presented with follow-up from the ER after he presented with oral pharyngeal dysphasia he had an endoscopy biopsy which was pending he was dilated as well and now is having no problems with his swallowing or secretions    Review of Systems:   Constitutional:  Denies fever or chills   Eyes:  Denies change in visual acuity   HENT:  Denies nasal congestion or sore throat   Respiratory:  Denies cough or shortness of breath   Cardiovascular:  Denies chest pain or edema   GI:  Denies abdominal pain, nausea, vomiting, bloody stools or diarrhea   :  Denies dysuria   Musculoskeletal:  Denies back pain or joint pain   Integument:  Denies rash   Neurologic:  Denies headache, focal weakness or sensory changes   Endocrine:  Denies polyuria or polydipsia   Lymphatic:  Denies swollen glands   Psychiatric:  Denies depression or anxiety     Past Medical History:        Diagnosis Date    Blood pressure elevated without history of HTN 12/11/2017    Migraine without aura and without status migrainosus, not intractable 12/11/2017    New daily persistent headache 10/11/2016    Oropharyngeal dysphagia 3/25/2020       Past Surgical History:        Procedure Laterality Date    HERNIA REPAIR      UPPER GASTROINTESTINAL ENDOSCOPY N/A 3/24/2020    EGD DILATION BALLOON performed by Tootie Gamble MD at [address] [place] [address] N/A 3/24/2020    EGD BIOPSY performed by Tootie Gamble MD at [facility] ENDOSCOPY       Family History:  Family History   Problem Relation Age of Onset    Depression Mother     Cancer Paternal Uncle     Cancer Maternal Grandmother     Cancer Maternal Grandfather     Cancer Paternal Grandmother     Cancer Paternal Grandfather     Diabetes Other        Social History:  Social History     Socioeconomic History    Marital status: Single     Spouse name: None    Number of children: None    Years of education: None    Highest education level: None   Occupational History    None   Social Needs    Financial resource strain: None    Food insecurity     Worry: None     Inability: None    Transportation needs     Medical: None     Non-medical: None   Tobacco Use    Smoking status: Never Smoker    Smokeless tobacco: Never Used   Substance and Sexual Activity    Alcohol use: Yes     Alcohol/week: 5.0 standard drinks     Types: 5 Cans of beer per week     Comment: 2 drinks daily     Drug use: No    Sexual activity: None   Lifestyle    Physical activity     Days per week: None     Minutes per session: None    Stress: None   Relationships    Social connections     Talks on phone: None     Gets together: None     Attends Islam service: None     Active member of club or organization: None     Attends meetings of clubs or organizations: None     Relationship status: None    Intimate partner violence     Fear of current or ex partner: None     Emotionally abused: None     Physically abused: None     Forced sexual activity: None   Other Topics Concern    None   Social History Narrative    None         Allergies:  Patient has no known allergies. Current Medications:    Prior to Admission medications    Medication Sig Start Date End Date Taking?  Authorizing Provider   omeprazole (PRILOSEC) 20 MG delayed release capsule Take 1 capsule by mouth every morning (before breakfast) 3/24/20  Yes Matt Martins MD   Magnesium 400 MG TABS Take by mouth   Yes Historical Provider, MD   Coenzyme Q10 (CO Q-10) 300 MG CAPS Take by mouth   Yes Historical Provider, MD   vitamin D (CHOLECALCIFEROL) 1000 UNIT TABS tablet Take 1,000 Units by mouth daily   Yes Historical Provider, MD   cetirizine (ZYRTEC) 10 MG tablet Take 10 mg by mouth daily   Yes Historical Provider, MD       Physical Exam:  Vital Signs: BP (!) 140/78   Ht 5' 10\" (1.778 m)   Wt 142 lb (64.4 kg)   BMI 20.37 kg/m² General: Patient appears  non-toxic  HENT: Atraumatic, normocephalic, oral mucosa moist  Lungs:  Clear bilaterally  Heart: Regular rate and rhythm  Abdomen: Non-distended, soft, non-tender  Extremities: No edema  Neuro: Nonfocal    Medical Decision Making and Plan:  Pertinent Labs & Imaging studies reviewed. (See chart for details)      1.  Oropharyngeal dysphagia  Problem is stable he is to follow-up with GI

## 2020-03-26 ENCOUNTER — PATIENT MESSAGE (OUTPATIENT)
Dept: INTERNAL MEDICINE CLINIC | Age: 35
End: 2020-03-26

## 2020-03-27 ENCOUNTER — TELEPHONE (OUTPATIENT)
Dept: INTERNAL MEDICINE CLINIC | Age: 35
End: 2020-03-27

## 2020-03-27 NOTE — TELEPHONE ENCOUNTER
Visit Follow-Up Question     From  Benjamin Matta To  [facility] 111 Practice Support Sent  3/26/2020  9:48 AM   Good morning Dr. Arleen Mancini - Last night my left arm started to have a tingling sensation along with just a general feeling a weakness. The sensation starts just above where the IV was placed and continues down to my hand. Not sure if this is a potential side effect of the IV or something I should be concerned about.      Thanks,     Leonides Ruiz

## 2020-03-31 ENCOUNTER — TELEPHONE (OUTPATIENT)
Dept: INTERNAL MEDICINE CLINIC | Age: 35
End: 2020-03-31

## 2020-03-31 NOTE — TELEPHONE ENCOUNTER
Test Results Question     From  Yosi Brad To  [facility] [id number] [facility]51 Practice Support Sent  3/30/2020  4:05 PM   Hi Dr. Tierra Irizarry - I called up the Dr. Rashad Pierre office (the GI who performed the endoscopy) today as directed to receive the results of the procedure however they told me that they still have not received the results back from [facility]. Is there anything you can do to help move the process forward? I received a notification in [address] last Thursday saying that the results were completed.      Thank you,     Barrett Griggs

## 2020-04-01 ENCOUNTER — TELEPHONE (OUTPATIENT)
Dept: INTERNAL MEDICINE CLINIC | Age: 35
End: 2020-04-01

## 2020-05-29 ENCOUNTER — TELEPHONE (OUTPATIENT)
Dept: INTERNAL MEDICINE CLINIC | Age: 35
End: 2020-05-29

## 2020-05-29 ENCOUNTER — OFFICE VISIT (OUTPATIENT)
Dept: INTERNAL MEDICINE CLINIC | Age: 35
End: 2020-05-29
Payer: COMMERCIAL

## 2020-05-29 VITALS
HEART RATE: 73 BPM | BODY MASS INDEX: 20.47 KG/M2 | DIASTOLIC BLOOD PRESSURE: 78 MMHG | WEIGHT: 143 LBS | SYSTOLIC BLOOD PRESSURE: 142 MMHG | HEIGHT: 70 IN | TEMPERATURE: 98.1 F

## 2020-05-29 PROBLEM — J30.2 SEASONAL ALLERGIES: Status: ACTIVE | Noted: 2020-05-29

## 2020-05-29 PROCEDURE — 99213 OFFICE O/P EST LOW 20 MIN: CPT | Performed by: HOSPITALIST

## 2020-05-29 NOTE — PROGRESS NOTES
well-developed. HENT:      Head: Normocephalic and atraumatic. Right Ear: Tympanic membrane, ear canal and external ear normal. There is no impacted cerumen. Left Ear: Tympanic membrane, ear canal and external ear normal. There is no impacted cerumen. Nose: Nose normal. No congestion or rhinorrhea. Mouth/Throat:      Mouth: Mucous membranes are moist.      Pharynx: Oropharynx is clear. No posterior oropharyngeal erythema. Eyes:      General: No scleral icterus. Pupils: Pupils are equal, round, and reactive to light. Neck:      Musculoskeletal: Normal range of motion. Vascular: No JVD. Cardiovascular:      Rate and Rhythm: Normal rate and regular rhythm. Heart sounds: Normal heart sounds. No murmur. No friction rub. No gallop. Pulmonary:      Effort: Pulmonary effort is normal. No respiratory distress. Breath sounds: Normal breath sounds. No wheezing or rales. Abdominal:      General: Bowel sounds are normal. There is no distension. Palpations: Abdomen is soft. Tenderness: There is no abdominal tenderness. Musculoskeletal: Normal range of motion. Skin:     General: Skin is warm and dry. Neurological:      Mental Status: He is alert and oriented to person, place, and time. Cranial Nerves: No cranial nerve deficit. Assessment/ plan:     Colby Francis was seen today for ear problem. Diagnoses and all orders for this visit:    Acute ear pain, left  -Pain is dull, experience it after possible insect flew by the ear. Did not notice any sharp pain, no signs of infection, no sensitivity. Pain is dull and getting better. No erythema noted  -Fullness behind eardrum     Chronic allergic rhinitis  -Start flonase daily. Technique explained  -Continue zyrtec      Return if symptoms worsen or fail to improve.     Feliz Dowell MD

## 2020-07-08 ENCOUNTER — TELEPHONE (OUTPATIENT)
Dept: INTERNAL MEDICINE CLINIC | Age: 35
End: 2020-07-08

## 2020-07-10 DIAGNOSIS — R03.0 BLOOD PRESSURE ELEVATED WITHOUT HISTORY OF HTN: ICD-10-CM

## 2020-07-10 DIAGNOSIS — Z00.00 WELL ADULT EXAM: ICD-10-CM

## 2020-07-10 LAB
A/G RATIO: 2 (ref 1.1–2.2)
ALBUMIN SERPL-MCNC: 4.5 G/DL (ref 3.4–5)
ALP BLD-CCNC: 67 U/L (ref 40–129)
ALT SERPL-CCNC: 20 U/L (ref 10–40)
ANION GAP SERPL CALCULATED.3IONS-SCNC: 15 MMOL/L (ref 3–16)
AST SERPL-CCNC: 22 U/L (ref 15–37)
BASOPHILS ABSOLUTE: 0 K/UL (ref 0–0.2)
BASOPHILS RELATIVE PERCENT: 0.4 %
BILIRUB SERPL-MCNC: 0.8 MG/DL (ref 0–1)
BUN BLDV-MCNC: 9 MG/DL (ref 7–20)
CALCIUM SERPL-MCNC: 9.6 MG/DL (ref 8.3–10.6)
CHLORIDE BLD-SCNC: 95 MMOL/L (ref 99–110)
CHOLESTEROL, TOTAL: 189 MG/DL (ref 0–199)
CO2: 24 MMOL/L (ref 21–32)
CREAT SERPL-MCNC: 0.8 MG/DL (ref 0.9–1.3)
EOSINOPHILS ABSOLUTE: 0.1 K/UL (ref 0–0.6)
EOSINOPHILS RELATIVE PERCENT: 1.5 %
GFR AFRICAN AMERICAN: >60
GFR NON-AFRICAN AMERICAN: >60
GLOBULIN: 2.2 G/DL
GLUCOSE BLD-MCNC: 83 MG/DL (ref 70–99)
HCT VFR BLD CALC: 44.5 % (ref 40.5–52.5)
HDLC SERPL-MCNC: 52 MG/DL (ref 40–60)
HEMOGLOBIN: 15 G/DL (ref 13.5–17.5)
LDL CHOLESTEROL CALCULATED: 124 MG/DL
LYMPHOCYTES ABSOLUTE: 1.6 K/UL (ref 1–5.1)
LYMPHOCYTES RELATIVE PERCENT: 30.7 %
MCH RBC QN AUTO: 29.7 PG (ref 26–34)
MCHC RBC AUTO-ENTMCNC: 33.8 G/DL (ref 31–36)
MCV RBC AUTO: 88 FL (ref 80–100)
MONOCYTES ABSOLUTE: 0.6 K/UL (ref 0–1.3)
MONOCYTES RELATIVE PERCENT: 11 %
NEUTROPHILS ABSOLUTE: 2.9 K/UL (ref 1.7–7.7)
NEUTROPHILS RELATIVE PERCENT: 56.4 %
PDW BLD-RTO: 13 % (ref 12.4–15.4)
PLATELET # BLD: 213 K/UL (ref 135–450)
PMV BLD AUTO: 8.6 FL (ref 5–10.5)
POTASSIUM SERPL-SCNC: 4 MMOL/L (ref 3.5–5.1)
RBC # BLD: 5.05 M/UL (ref 4.2–5.9)
SODIUM BLD-SCNC: 134 MMOL/L (ref 136–145)
TOTAL PROTEIN: 6.7 G/DL (ref 6.4–8.2)
TRIGL SERPL-MCNC: 63 MG/DL (ref 0–150)
TSH REFLEX: 2.2 UIU/ML (ref 0.27–4.2)
VLDLC SERPL CALC-MCNC: 13 MG/DL
WBC # BLD: 5.2 K/UL (ref 4–11)

## 2020-07-15 ENCOUNTER — OFFICE VISIT (OUTPATIENT)
Dept: INTERNAL MEDICINE CLINIC | Age: 35
End: 2020-07-15
Payer: COMMERCIAL

## 2020-07-15 VITALS
WEIGHT: 142 LBS | TEMPERATURE: 99 F | SYSTOLIC BLOOD PRESSURE: 139 MMHG | BODY MASS INDEX: 20.33 KG/M2 | DIASTOLIC BLOOD PRESSURE: 79 MMHG | HEIGHT: 70 IN

## 2020-07-15 PROBLEM — R13.12 OROPHARYNGEAL DYSPHAGIA: Status: RESOLVED | Noted: 2020-03-25 | Resolved: 2020-07-15

## 2020-07-15 PROBLEM — K20.0 EOSINOPHILIC ESOPHAGITIS: Status: ACTIVE | Noted: 2020-07-15

## 2020-07-15 PROCEDURE — 99395 PREV VISIT EST AGE 18-39: CPT | Performed by: INTERNAL MEDICINE

## 2020-07-15 RX ORDER — OMEPRAZOLE 40 MG/1
40 CAPSULE, DELAYED RELEASE ORAL DAILY
COMMUNITY

## 2020-07-15 NOTE — PROGRESS NOTES
Annual Wellness Visit     Patient:  Debbora Snellen                                               : 1985  Age: 30 y.o. MRN: <[id number]>  Date : 7/15/2020      CHIEF COMPLAINT: Debbora Snellen is a 30 y.o. male who presents for : Physical exam    1. Migraine without aura and without status migrainosus, not intractable  Problem has been asymptomatic at present he does have some tension headaches which resolved with over-the-counter medicine    2. Eosinophilic esophagitis  This problem is stable he was diagnosed with eosinophilic esophagitis 3 months ago has been on high-dose Prilosec and is been doing well    3.  PE (physical exam), annual  Generally feels good has been exercising regularly has been working at home denies any chest pain shortness of breath or any other problems  - [facility] - Gloria Doan MD, Gastroenterology, [facility]        Patient Active Problem List    Diagnosis Date Noted    Eosinophilic esophagitis     Seasonal allergies 2020    Multiple benign melanocytic nevi 2020    Migraine without aura and without status migrainosus, not intractable 2017    Blood pressure elevated without history of HTN 2017       Constitutional:  Denies fever or chills   Eyes:  Denies change in visual acuity   HENT:  Denies nasal congestion or sore throat   Respiratory:  Denies cough or shortness of breath   Cardiovascular:  Denies chest pain or edema   GI:  Denies abdominal pain, nausea, vomiting, bloody stools or diarrhea   :  Denies dysuria   Musculoskeletal:  Denies back pain or joint pain   Integument:  Denies rash   Neurologic:  Denies headache, focal weakness or sensory changes   Endocrine:  Denies polyuria or polydipsia   Lymphatic:  Denies swollen glands   Psychiatric:  Denies depression or anxiety     Past Medical History:        Diagnosis Date    Blood pressure elevated without history of HTN 10/58/9335    Eosinophilic esophagitis     Migraine without aura and without status migrainosus, not intractable 12/11/2017    New daily persistent headache 10/11/2016    Oropharyngeal dysphagia 3/25/2020       Past Surgical History:        Procedure Laterality Date    HERNIA REPAIR      UPPER GASTROINTESTINAL ENDOSCOPY N/A 3/24/2020    EGD DILATION BALLOON performed by Jase Stoddard MD at [address] N/A 3/24/2020    EGD BIOPSY performed by Jase Stoddard MD at [facility] ENDOSCOPY       Family History:  Family History   Problem Relation Age of Onset    Depression Mother     Cancer Paternal Uncle     Cancer Maternal Grandmother     Cancer Maternal Grandfather     Cancer Paternal Grandmother     Cancer Paternal Grandfather     Diabetes Other        Social History:  Social History     Socioeconomic History    Marital status: Single     Spouse name: None    Number of children: None    Years of education: None    Highest education level: None   Occupational History    None   Social Needs    Financial resource strain: None    Food insecurity     Worry: None     Inability: None    Transportation needs     Medical: None     Non-medical: None   Tobacco Use    Smoking status: Never Smoker    Smokeless tobacco: Never Used   Substance and Sexual Activity    Alcohol use:  Yes     Alcohol/week: 5.0 standard drinks     Types: 5 Cans of beer per week     Comment: 2 drinks daily     Drug use: No    Sexual activity: None   Lifestyle    Physical activity     Days per week: None     Minutes per session: None    Stress: None   Relationships    Social connections     Talks on phone: None     Gets together: None     Attends Bahai service: None     Active member of club or organization: None     Attends meetings of clubs or organizations: None     Relationship status: None    Intimate partner violence     Fear of current or ex partner: None     Emotionally abused: None     Physically abused: None     Forced sexual activity: None   Other Topics Concern    None   Social History Narrative    None         Allergies:  Patient has no known allergies. Current Medications:    Prior to Admission medications    Medication Sig Start Date End Date Taking? Authorizing Provider   omeprazole (PRILOSEC) 40 MG delayed release capsule Take 40 mg by mouth daily   Yes Historical Provider, MD   vitamin D (CHOLECALCIFEROL) 1000 UNIT TABS tablet Take 1,000 Units by mouth daily   Yes Historical Provider, MD   cetirizine (ZYRTEC) 10 MG tablet Take 10 mg by mouth daily   Yes Historical Provider, MD           Physical Exam:      Constitutional:  Well developed, well nourished, no acute distress, non-toxic appearance   Eyes:  PERRL, conjunctiva normal   HENT:  Atraumatic, external ears normal, nose normal, oropharynx moist, no pharyngeal exudates. Neck- normal range of motion, no tenderness, supple   Respiratory:  No respiratory distress, normal breath sounds, no rales, no wheezing   Cardiovascular:  Normal rate, normal rhythm, no murmurs, no gallops, no rubs   GI:  Soft, nondistended, normal bowel sounds, nontender, no organomegaly, no mass, no rebound, no guarding   :  No costovertebral angle tenderness   Musculoskeletal:  No edema, no tenderness, no deformities. Back- no tenderness  Integument:  Well hydrated, no rash   Lymphatic:  No lymphadenopathy noted   Neurologic:  Alert & oriented x 3, CN 2-12 normal, normal motor function, normal sensory function, no focal deficits noted   Psychiatric:  Speech and behavior appropriate       Vitals: /79   Temp 99 °F (37.2 °C)   Ht 5' 10\" (1.778 m)   Wt 142 lb (64.4 kg)   BMI 20.37 kg/m²     Body mass index is 20.37 kg/m².   Wt Readings from Last 3 Encounters:   07/15/20 142 lb (64.4 kg)   05/29/20 143 lb (64.9 kg)   03/25/20 142 lb (64.4 kg)         LABS:    CBC:   Lab Results   Component Value Date    WBC 5.2 07/10/2020    HGB 15.0 07/10/2020    HCT 44.5 07/10/2020    MCV 88.0 07/10/2020  07/10/2020           Lab Results   Component Value Date    [id number] 697 12/09/2016                                                             BMP:    Lab Results   Component Value Date     (L) 07/10/2020    K 4.0 07/10/2020    CL 95 (L) 07/10/2020    CO2 24 07/10/2020       LFT's:   Lab Results   Component Value Date    ALT 20 07/10/2020    AST 22 07/10/2020    ALKPHOS 67 07/10/2020    BILITOT 0.8 07/10/2020       Lipids:   Lab Results   Component Value Date    CHOL 189 07/10/2020    HDL 52 07/10/2020    LDLCALC 124 (H) 07/10/2020    TRIG 63 07/10/2020       INR: No results found for: INR, PROTIME    U/A:  Lab Results   Component Value Date    LABMICR Not Indicated 11/16/2017        No results found for: LABA1C     Lab Results   Component Value Date    CREATININE 0.8 (L) 07/10/2020       -----------------------------------------------------------------     Assessment/Plan:   1. Migraine without aura and without status migrainosus, not intractable  Problem is stable continue over-the-counter medicine    2. Eosinophilic esophagitis  Problem is stable continue Prilosec will follow-up with GI    3.  PE (physical exam), annual  Check screening labs continue diet and exercise continue above meds he is to follow-up with GI and follow-up in this office in 1 year for cydney Zee MD, Gastroenterology, [facility]

## 2021-01-28 ENCOUNTER — OFFICE VISIT (OUTPATIENT)
Dept: INTERNAL MEDICINE CLINIC | Age: 36
End: 2021-01-28
Payer: COMMERCIAL

## 2021-01-28 ENCOUNTER — TELEPHONE (OUTPATIENT)
Dept: INTERNAL MEDICINE CLINIC | Age: 36
End: 2021-01-28

## 2021-01-28 VITALS
WEIGHT: 141.6 LBS | BODY MASS INDEX: 20.32 KG/M2 | DIASTOLIC BLOOD PRESSURE: 110 MMHG | TEMPERATURE: 97.7 F | SYSTOLIC BLOOD PRESSURE: 158 MMHG

## 2021-01-28 DIAGNOSIS — R07.89 CHEST PRESSURE: Primary | ICD-10-CM

## 2021-01-28 DIAGNOSIS — R03.0 BLOOD PRESSURE ELEVATED WITHOUT HISTORY OF HTN: ICD-10-CM

## 2021-01-28 DIAGNOSIS — F41.9 ANXIETY: ICD-10-CM

## 2021-01-28 PROCEDURE — 93000 ELECTROCARDIOGRAM COMPLETE: CPT | Performed by: INTERNAL MEDICINE

## 2021-01-28 PROCEDURE — 99213 OFFICE O/P EST LOW 20 MIN: CPT | Performed by: INTERNAL MEDICINE

## 2021-01-28 RX ORDER — LORAZEPAM 0.5 MG/1
0.5 TABLET ORAL EVERY 8 HOURS PRN
Qty: 20 TABLET | Refills: 0 | Status: SHIPPED | OUTPATIENT
Start: 2021-01-28 | End: 2021-02-04

## 2021-01-28 ASSESSMENT — ENCOUNTER SYMPTOMS
SHORTNESS OF BREATH: 0
EYES NEGATIVE: 1
CONSTIPATION: 0
WHEEZING: 0
ABDOMINAL DISTENTION: 0
EYE PAIN: 0
VOMITING: 0
DIARRHEA: 0
GASTROINTESTINAL NEGATIVE: 1
APNEA: 0
COLOR CHANGE: 0
NAUSEA: 0
RESPIRATORY NEGATIVE: 1
ALLERGIC/IMMUNOLOGIC NEGATIVE: 1
CHEST TIGHTNESS: 0
COUGH: 0
CHOKING: 0
STRIDOR: 0
PHOTOPHOBIA: 0
BACK PAIN: 0

## 2021-01-28 ASSESSMENT — PATIENT HEALTH QUESTIONNAIRE - PHQ9
SUM OF ALL RESPONSES TO PHQ QUESTIONS 1-9: 0
SUM OF ALL RESPONSES TO PHQ QUESTIONS 1-9: 0
2. FEELING DOWN, DEPRESSED OR HOPELESS: 0
1. LITTLE INTEREST OR PLEASURE IN DOING THINGS: 0

## 2021-01-28 NOTE — PROGRESS NOTES
problems, confusion, decreased concentration, dysphoric mood, hallucinations, self-injury, sleep disturbance and suicidal ideas. The patient is nervous/anxious. Past Medical History:    Past Medical History:   Diagnosis Date    Blood pressure elevated without history of HTN 33/07/8431    Eosinophilic esophagitis 5/21/9405    Migraine without aura and without status migrainosus, not intractable 12/11/2017    New daily persistent headache 10/11/2016    Oropharyngeal dysphagia 3/25/2020       Past Surgical History:  Past Surgical History:   Procedure Laterality Date    HERNIA REPAIR      UPPER GASTROINTESTINAL ENDOSCOPY N/A 3/24/2020    EGD DILATION BALLOON performed by Keny Melendez MD at [facility] 3/24/2020    EGD BIOPSY performed by Keny Melendez MD at [address] [facility] Medications:  Prior to Visit Medications    Medication Sig Taking? Authorizing Provider   LORazepam (ATIVAN) 0.5 MG tablet Take 1 tablet by mouth every 8 hours as needed for Anxiety for up to 7 days. Yes Kati Lopez MD   omeprazole (PRILOSEC) 40 MG delayed release capsule Take 40 mg by mouth daily Yes Historical Provider, MD   vitamin D (CHOLECALCIFEROL) 1000 UNIT TABS tablet Take 1,000 Units by mouth daily  Historical Provider, MD   cetirizine (ZYRTEC) 10 MG tablet Take 10 mg by mouth daily  Historical Provider, MD        Allergies:    Patient has no known allergies. Family History:       Problem Relation Age of Onset    Depression Mother     Cancer Paternal Uncle     Cancer Maternal Grandmother     Cancer Maternal Grandfather     Cancer Paternal Grandmother     Cancer Paternal Grandfather     Diabetes Other        Social History:  Social History     Tobacco Use    Smoking status: Never Smoker    Smokeless tobacco: Never Used   Substance Use Topics    Alcohol use:  Yes     Alcohol/week: 5.0 standard drinks     Types: 5 Cans of beer per week     Comment: 2 drinks daily     Drug use: No       Data: Old records have been reviewed electronically. PHYSICAL EXAM:  BP (!) 158/110   Temp 97.7 °F (36.5 °C) (Temporal)   Wt 141 lb 9.6 oz (64.2 kg)   BMI 20.32 kg/m²   Physical Exam  Constitutional:       General: He is not in acute distress. Appearance: Normal appearance. He is not ill-appearing, toxic-appearing or diaphoretic. HENT:      Head: Normocephalic and atraumatic. Eyes:      General: No scleral icterus. Right eye: No discharge. Left eye: No discharge. Extraocular Movements: Extraocular movements intact. Conjunctiva/sclera: Conjunctivae normal.      Pupils: Pupils are equal, round, and reactive to light. Neck:      Musculoskeletal: Normal range of motion and neck supple. Cardiovascular:      Rate and Rhythm: Normal rate and regular rhythm. Pulses: Normal pulses. Heart sounds: Normal heart sounds. No murmur. No friction rub. No gallop. Pulmonary:      Effort: Pulmonary effort is normal. No respiratory distress. Breath sounds: Normal breath sounds. No wheezing or rales. Abdominal:      General: Abdomen is flat. Bowel sounds are normal. There is no distension. Palpations: Abdomen is soft. Musculoskeletal: Normal range of motion. Right lower leg: No edema. Left lower leg: No edema. Skin:     General: Skin is warm and dry. Coloration: Skin is not jaundiced. Neurological:      General: No focal deficit present. Mental Status: He is alert and oriented to person, place, and time. Mental status is at baseline. Cranial Nerves: No cranial nerve deficit. Sensory: No sensory deficit. Motor: No weakness. Coordination: Coordination normal.      Gait: Gait normal.      Deep Tendon Reflexes: Reflexes normal.   Psychiatric:         Mood and Affect: Mood normal.         Behavior: Behavior normal.         Thought Content:  Thought content normal.         Judgment: Judgment normal. Comments: Appears anxious         Health Maintanence:  Health Maintenance   Topic Date Due    Hepatitis C screen  1985    Varicella vaccine (1 of 2 - 2-dose childhood series) 05/12/1987    HIV screen  05/12/2001    COVID-19 Vaccine (2 of 2 - Moderna series) 02/04/2021    DTaP/Tdap/Td vaccine (2 - Td) 12/11/2027    Flu vaccine  Completed    Hepatitis A vaccine  Aged Out    Hepatitis B vaccine  Aged Out    Hib vaccine  Aged Out    Meningococcal (ACWY) vaccine  Aged Out    Pneumococcal 0-64 years Vaccine  Aged Out       Assessment & Plan:      Kasey Wagoner was seen today for other and anxiety. Diagnoses and all orders for this visit:    Chest pressure: Intermittent. Denies that it is similar to pain from previous esophagitis. Patient stated appears to be situational.  Improved with activity and exercise. Patient will follow up if continues to have pain/pressure. EKG shows right bundle branch block was a normal variant for young healthy individual  -     EKG 12 Lead  -     LORazepam (ATIVAN) 0.5 MG tablet; Take 1 tablet by mouth every 8 hours as needed for Anxiety for up to 7 days.  -     External Referral To Psychology    Anxiety: Patient admits to more situational anxiety, especially when having to present at meetings  -     LORazepam (ATIVAN) 0.5 MG tablet; Take 1 tablet by mouth every 8 hours as needed for Anxiety for up to 7 days.  -     External Referral To Psychology    Blood pressure elevated without h/o HTN: Patient admits to white coat anxiety.  Anxiety could be artificially increasing BP   -Repeat BP in clinic shows decreased at the end of visit.  -Patient instructed to take BP at home we will continue to monitor          Dispo: Pt has been staffed with Dr. Vania Pena  _______________  Syd Bryant MD  Internal Medicine, PGY-2  1/28/2021 5:04 PM

## 2021-02-03 ENCOUNTER — VIRTUAL VISIT (OUTPATIENT)
Dept: PSYCHOLOGY | Age: 36
End: 2021-02-03
Payer: COMMERCIAL

## 2021-02-03 DIAGNOSIS — F40.10 SOCIAL ANXIETY DISORDER: Primary | ICD-10-CM

## 2021-02-03 PROCEDURE — 90791 PSYCH DIAGNOSTIC EVALUATION: CPT | Performed by: PSYCHOLOGIST

## 2021-02-03 NOTE — PROGRESS NOTES
Patient's call back number: [phone]   Patient's emergency contact's name and number, as well as permission to contact them if needed: Extended Emergency Contact Information  Primary Emergency Contact: [place] [address] [place] [address] Phone: [phone]  Relation: Parent  Secondary Emergency Contact: [address] [facility] Phone: [phone]  Mobile Phone: [phone]  Relation: Parent     Provider location: Jens Guest:  Patient presents with concerns about anxiety. The pt stated that he has always been more socially anxious  Sx have been increased over the last few month and feels less manageable. 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 Anxiety does not appear to interfere with other areas of daily living. Social concerns are pervasive, likely exacerbated by less contact during [place] pandemic.      A:  MSE:    Appearance: good hygiene  and appropriate attire  Attitude: cooperative and friendly  Consciousness: alert  Orientation: oriented to person, place, time, general circumstance  Memory: recent and remote memory intact  Attention/Concentration: intact during session  Psychomotor Activity:normal  Eye Contact: normal  Speech: normal rate and volume, well-articulated  Mood: anxious   Affect: congruent Perception: within normal limits  Thought Content: within normal limits  Thought Process: logical, coherent and goal-directed  Insight: good  Judgment: intact  Ability to understand instructions: Yes  Ability to respond meaningfully: Yes  Morbid Ideation: no   Suicide Assessment: no suicidal ideation, plan, or intent  Homicidal Ideation: no    Diagnosis:    1.  Social anxiety disorder          Diagnosis Date    Blood pressure elevated without history of HTN 77/06/5381    Eosinophilic esophagitis 8/96/2034    Migraine without aura and without status migrainosus, not intractable 12/11/2017    New daily persistent headache 10/11/2016    Oropharyngeal dysphagia 3/25/2020       Plan:  Pt interventions:  Discussed prevalence of  anxiety for general population, Provided education on the use of medication to treat  anxiety, Discussed potential treatments for  anxiety, Discussed self-care (sleep, nutrition, rewarding activities, social support, exercise), Established rapport, Conducted functional assessment, [place]-setting to identify pt's primary goals for [facility] visit / overall health and Supportive techniques Discussed limitations of benzo use     Pt Behavioral Change Plan:   See Pt Instructions

## 2021-02-05 NOTE — PATIENT INSTRUCTIONS
Create hierarchy using SUDs (Subjective Unit of Distress) rating 1-10   Least anxiety provoking  1  2  3  4  5  6  7  8  9  10  Most anxiety provoking

## 2021-02-10 ENCOUNTER — VIRTUAL VISIT (OUTPATIENT)
Dept: PSYCHOLOGY | Age: 36
End: 2021-02-10
Payer: COMMERCIAL

## 2021-02-10 DIAGNOSIS — F40.10 SOCIAL ANXIETY DISORDER: Primary | ICD-10-CM

## 2021-02-10 PROCEDURE — 90834 PSYTX W PT 45 MINUTES: CPT | Performed by: PSYCHOLOGIST

## 2021-02-10 NOTE — PATIENT INSTRUCTIONS
Spend time1x daily (anytime of day) doing an exposure exercise     Focus on imagined (or real) exposure of a family/friend get together OR imagined exposure of biweekly meeting  Rate your anxiety (SUDs rating 1-10) about thinking about it. Close your eyes and put yourself in the experience. Notice the safety mode you go into. Rate your anxiety (SUDs rating 1-10) at it's peak   Now shift over to Vital Action mode. Rate your anxiety (SUDs rating 1-10) right after the experience. Rate it again 5 minutes after. Safety Mode-        Pay Attention to Social Danger       Use Safety Behaviors       Resist Anxious Feelings       Fuse with Anxious Thoughts    Vital Action Mode-        Pay Attention to What Matters (focus on content, the person you're talking to, observe the room an interaction vs yourself)        Choose Values-Based Action (focus on what you're doing that is consistent with your values and what matters above)         Open Up to Anxious Feelings (Feel the feelings, notice the sensation, don't push it away or be afraid of them)        Defuse from Anxious Thoughts (Instead of \"I'm going to sound stupid. Melissa Heman Melissa Heman \"  \" I notice that I am having the thought that I may sound silly. It is a thought, random thoughts come up all the time. . it does not make it a reality. \"     Follow up in 1 week

## 2021-02-10 NOTE — PROGRESS NOTES
Behavioral Health Consultation  Aurea Lopez Psy.D. Clinical Psychologist/ Behavioral Health Consultant  2/10/2021  8:46 AM    Time spent with Patient: 38 minutes  [facility]. Reason for Consult:    Chief Complaint   Patient presents with    Anxiety     Referring Provider: No referring provider defined for this encounter. Feedback given to PCP. TELEHEALTH VISIT -- Audio/Visual (During LLCWR-86 public health emergency)  }  Pursuant to the emergency declaration under the [address] [place] [address], [facility] waiver authority and the [employer] Act, this Virtual Visit was conducted, with patient's consent, to reduce the patient's risk of exposure to COVID-19 and provide continuity of care for an established patient. Services were provided through a video synchronous discussion virtually to substitute for in-person clinic visit. Pt gave verbal informed consent to participate in telehealth services. Conducted a risk-benefit analysis and determined that the patient's presenting problems are consistent with the use of telepsychology. Determined that the patient has sufficient knowledge and skills in the use of technology enabling them to adequately benefit from telepsychology. It was determined that this patient was able to be properly treated without an in-person session. Patient verified that they were currently located at the [facility] address that was provided during registration.     Verified the following information:  Patient's identification: Yes  Patient location: [address] [place] [address] [address]   Patient's call back number: [phone]   Patient's emergency contact's name and number, as well as permission to contact them if needed: Extended Emergency Contact Information  [facility] [address] Phone: [phone]  Relation: Parent Secondary Emergency Contact: [address] [facility] Phone: [phone]  Mobile Phone: [phone]  Relation: Parent     Provider location: Drake Bueno:  Completed anxiety hierarchy   Slept well for the most part   Thursday night- anticipating big meeting, COVID vaccine     Exposure hierarchy:     family visits (2), informal meetings without preparing (2), biweekly touchbase (2), weekly HR review (3), project meeting (3),  Last minute meeting with roundtable introduction (5), informal date (6), therapy appointments (6), monthly close call- coupled with second COVID vaccine (9)     Level is affected by   -preparation  -spotlight/explain things (higher depending on who he is talking to)     O:  The pt responded well to psychoeducation and implemented some practices independently to help relieve anxiety in the moment by reflecting on information provided. A:  MSE:  Appearance: good hygiene  and appropriate attire  Attitude: cooperative and friendly  Consciousness: alert  Orientation: oriented to person, place, time, general circumstance  Memory: recent and remote memory intact  Attention/Concentration: intact during session  Psychomotor Activity:normal  Eye Contact: normal  Speech: normal rate and volume, well-articulated  Mood: anxious  Affect: congruent  Perception: within normal limits  Thought Content: within normal limits  Thought Process: logical, coherent and goal-directed  Insight: good  Judgment: intact  Ability to understand instructions: Yes  Ability to respond meaningfully: Yes  Morbid Ideation: no   Suicide Assessment: no suicidal ideation, plan, or intent  Homicidal Ideation: no      A:    Diagnosis:    1.  Social anxiety disorder          Diagnosis Date    Blood pressure elevated without history of HTN 47/83/4705    Eosinophilic esophagitis 8/29/3600    Migraine without aura and without status migrainosus, not intractable 12/11/2017    New daily persistent headache 10/11/2016  Oropharyngeal dysphagia 3/25/2020       Plan:  Pt interventions:  Discussed prevalence of  anxiety for general population, Discussed various factors related to the development and maintenance of  anxiety (including biological, cognitive, behavioral, and environmental factors), Discussed potential treatments for  anxiety, Discussed self-care (sleep, nutrition, rewarding activities, social support, exercise), Conducted functional assessment, [place]-setting to identify pt's primary goals for [facility] visit / overall health, Supportive techniques and Provided Psychoeducation re: ACT informed exposure     Pt Behavioral Change Plan:   See Pt Instructions

## 2021-02-17 ENCOUNTER — OFFICE VISIT (OUTPATIENT)
Dept: PSYCHOLOGY | Age: 36
End: 2021-02-17
Payer: COMMERCIAL

## 2021-02-17 DIAGNOSIS — F40.10 SOCIAL ANXIETY DISORDER: Primary | ICD-10-CM

## 2021-02-17 PROCEDURE — 90832 PSYTX W PT 30 MINUTES: CPT | Performed by: PSYCHOLOGIST

## 2021-02-17 NOTE — PROGRESS NOTES
Behavioral Health Consultation  Aurea Lopez Psy.D. Clinical Psychologist/ Behavioral Health Consultant  2/17/2021  9:35 AM    Time spent with Patient: 30 minutes  This is patient's third [facility] appointment. Reason for Consult:    Chief Complaint   Patient presents with    Anxiety     Referring Provider: No referring provider defined for this encounter. Feedback given to PCP. TELEHEALTH VISIT -- Audio/Visual (During TYVXP-43 public health emergency)  }  Pursuant to the emergency declaration under the [address] [place] [address], [facility] waiver authority and the [employer] Act, this Virtual Visit was conducted, with patient's consent, to reduce the patient's risk of exposure to COVID-19 and provide continuity of care for an established patient. Services were provided through a video synchronous discussion virtually to substitute for in-person clinic visit. Pt gave verbal informed consent to participate in telehealth services. Conducted a risk-benefit analysis and determined that the patient's presenting problems are consistent with the use of telepsychology. Determined that the patient has sufficient knowledge and skills in the use of technology enabling them to adequately benefit from telepsychology. It was determined that this patient was able to be properly treated without an in-person session. Patient verified that they were currently located at the [facility] address that was provided during registration.     Verified the following information:  Patient's identification: Yes  Patient location: [address] [place] [address] [address]   Patient's call back number: [phone]   Patient's emergency contact's name and number, as well as permission to contact them if needed: Extended Emergency Contact Information  [facility] [address] Phone: [phone]  Relation: Parent  Secondary Emergency Contact: [address] [facility] Phone: [phone]  Mobile Phone: [phone]  Relation: Parent     Provider location: Jens Guest:  Less anxiety over the last week, mostly due to less social interactions/meetings that usually trigger anxiety    Completed homework- observed his general anxiety, low for the most part  Felt himself have the same thoughts but was familiar situation and ok with it   Anxiety increased with imagined exposure     O:  The pt successfully completed imagined exposure exercise combining with ACT techniques. Anxiety increased during exposure as expected although distress decreased. A:  MSE:  Appearance: good hygiene  and appropriate attire  Attitude: cooperative and friendly  Consciousness: alert  Orientation: oriented to person, place, time, general circumstance  Memory: recent and remote memory intact  Attention/Concentration: intact during session  Psychomotor Activity:normal  Eye Contact: normal  Speech: normal rate and volume, well-articulated  Mood: anxious   Affect: congruent  Perception: within normal limits  Thought Content: within normal limits  Thought Process: logical, coherent and goal-directed  Insight: good  Judgment: intact  Ability to understand instructions: Yes  Ability to respond meaningfully: Yes  Morbid Ideation: no   Suicide Assessment: no suicidal ideation, plan, or intent  Homicidal Ideation: no      A:    Diagnosis:    1.  Social anxiety disorder          Diagnosis Date    Blood pressure elevated without history of HTN 11/63/9524    Eosinophilic esophagitis 4/98/7000    Migraine without aura and without status migrainosus, not intractable 12/11/2017    New daily persistent headache 10/11/2016    Oropharyngeal dysphagia 3/25/2020       Plan:  Pt interventions:  Discussed various factors related to the development and maintenance of  anxiety (including biological, cognitive, behavioral, and environmental factors), Trained in strategies for increasing balanced thinking, Trained in relaxation strategies, Conducted functional assessment, [place]-setting to identify pt's primary goals for [facility] visit / overall health and Supportive techniques    Pt Behavioral Change Plan:   See Pt Instructions     1. The pt will continued imagined exposure exercises      2.  The pt will engage in cognitive defusion exercises

## 2021-03-04 ENCOUNTER — VIRTUAL VISIT (OUTPATIENT)
Dept: PSYCHOLOGY | Age: 36
End: 2021-03-04
Payer: COMMERCIAL

## 2021-03-04 DIAGNOSIS — F40.10 SOCIAL ANXIETY DISORDER: Primary | ICD-10-CM

## 2021-03-04 PROCEDURE — 90832 PSYTX W PT 30 MINUTES: CPT | Performed by: PSYCHOLOGIST

## 2021-03-04 NOTE — PROGRESS NOTES
Behavioral Health Consultation  Erica Kirby Psy.D. Clinical Psychologist/ Behavioral Health Consultant  3/4/2021  10:06 AM    Time spent with Patient: 30 minutes  This is patient's third [facility] appointment. Reason for Consult:    Chief Complaint   Patient presents with    Anxiety     Referring Provider: No referring provider defined for this encounter. Feedback given to PCP. TELEHEALTH VISIT -- Audio/Visual (During QBOYY-14 public health emergency)  }  Pursuant to the emergency declaration under the [facility], [facility] waiver authority and the [employer] Act, this Virtual Visit was conducted, with patient's consent, to reduce the patient's risk of exposure to COVID-19 and provide continuity of care for an established patient. Services were provided through a video synchronous discussion virtually to substitute for in-person clinic visit. Pt gave verbal informed consent to participate in telehealth services. Conducted a risk-benefit analysis and determined that the patient's presenting problems are consistent with the use of telepsychology. Determined that the patient has sufficient knowledge and skills in the use of technology enabling them to adequately benefit from telepsychology. It was determined that this patient was able to be properly treated without an in-person session. Patient verified that they were currently located at the [facility] address that was provided during registration.     Verified the following information:  Patient's identification: Yes  Patient location: [address] [place] [address] [address]   Patient's call back number: [phone]   Patient's emergency contact's name and number, as well as permission to contact them if needed: Extended Emergency Contact Information  Primary Emergency Contact: [place] [address] Phone: [phone]  Relation: Parent Secondary Emergency Contact: [address] [facility] Phone: [phone]  Mobile Phone: [phone]  Relation: Parent     Provider location: [facility] Manners:  The pt reported that daily interactions have been better  Most days lasts for a few minutes then goes away Heightened anxiety re meeting tomorrow   Yesterday higher anxiety took a 1/4 of Ativan    Discussed higher internal pressure to perform, have data line up with predictions   Heart rate increases, fears of performing poorly       O:  Discussed anxiety related to monthly meeting. Overall anxiety has been better managed with expected increases for more significant triggers. The pt would benefit from continued support and intervention for social anxiety     A:  MSE:    Appearance: good hygiene  and appropriate attire  Attitude: cooperative and friendly  Consciousness: alert  Orientation: oriented to person, place, time, general circumstance  Memory: recent and remote memory intact  Attention/Concentration: intact during session  Psychomotor Activity:normal  Eye Contact: normal  Speech: normal rate and volume, well-articulated  Mood: anxious   Affect: congruent  Perception: within normal limits  Thought Content: within normal limits  Thought Process: logical, coherent and goal-directed  Insight: good  Judgment: intact  Ability to understand instructions: Yes  Ability to respond meaningfully: Yes  Morbid Ideation: no   Suicide Assessment: no suicidal ideation, plan, or intent  Homicidal Ideation: no      A:    Diagnosis:    1.  Social anxiety disorder          Diagnosis Date    Blood pressure elevated without history of HTN 15/08/4731    Eosinophilic esophagitis 0/50/9148    Migraine without aura and without status migrainosus, not intractable 12/11/2017    New daily persistent headache 10/11/2016    Oropharyngeal dysphagia 3/25/2020         Plan:  Pt interventions:  Discussed various factors related to the development and maintenance of  anxiety (including biological, cognitive, behavioral, and environmental factors), Trained in strategies for increasing balanced thinking, Trained in relaxation strategies, Provided education, Established rapport, Conducted functional assessment, [place]-setting to identify pt's primary goals for [facility] visit / overall health and Supportive techniques    Pt Behavioral Change Plan:     1. The pt will utilize defusion techniques to minimize intensity and control of anxiety     2. The pt will utilize grounding techniques to reduce anxiety during meeting     3.  Recommended short hand notes in bullet points over written speech

## 2021-03-09 ENCOUNTER — VIRTUAL VISIT (OUTPATIENT)
Dept: PSYCHOLOGY | Age: 36
End: 2021-03-09
Payer: COMMERCIAL

## 2021-03-09 DIAGNOSIS — F40.10 SOCIAL ANXIETY DISORDER: Primary | ICD-10-CM

## 2021-03-09 PROCEDURE — 90832 PSYTX W PT 30 MINUTES: CPT | Performed by: PSYCHOLOGIST

## 2021-03-09 NOTE — PROGRESS NOTES
Behavioral Health Consultation  Santi Estrada Psy.D. Clinical Psychologist/ Behavioral Health Consultant  3/9/2021  11:43 AM    Time spent with Patient: 30 minutes  This is patient's fourth [facility] appointment. Reason for Consult:    Chief Complaint   Patient presents with    Anxiety     Referring Provider: No referring provider defined for this encounter. Feedback given to PCP. TELEHEALTH VISIT -- Audio/Visual (During JAXOV-16 public health emergency)  }  Pursuant to the emergency declaration under the [address] [place] [address] waiver authority and the [employer] Act, this Virtual Visit was conducted, with patient's consent, to reduce the patient's risk of exposure to COVID-19 and provide continuity of care for an established patient. Services were provided through a video synchronous discussion virtually to substitute for in-person clinic visit. Pt gave verbal informed consent to participate in telehealth services. Conducted a risk-benefit analysis and determined that the patient's presenting problems are consistent with the use of telepsychology. Determined that the patient has sufficient knowledge and skills in the use of technology enabling them to adequately benefit from telepsychology. It was determined that this patient was able to be properly treated without an in-person session. Patient verified that they were currently located at the [facility] address that was provided during registration.     Verified the following information:  Patient's identification: Yes  Patient location: [address] [place] [address] [address]   Patient's call back number: [phone]   Patient's emergency contact's name and number, as well as permission to contact them if needed: Extended Emergency Contact Information  Primary Emergency Contact: [place] [address] Phone: [phone]  Relation: Parent Secondary Emergency Contact: [address] [facility] Phone: [phone]  Mobile Phone: [phone]  Relation: Parent     Provider location: Suzy Scherer:  Monthly meeting went well overall, felt a bit shaky   Was able to stay focused on overall goals    Felt really good after, boss told him he did a great job  Confidence carried over the weekend  Roge wanted to \"test\" if he was ok,   Epifanio Loja for a run, then had a panic attack which increased as he was hyperfocused on it  [address] [place] which calmed him down but continued to feel tired/chest pain after    O:  The pt had an expected rebound panic episode after successfully engaging in a work meeting. The pt hyperfocused on \"checking\" for lack of anxiety which paradoxically increased anxiety. The pt responds well to behavioral intervention with psychoeducation    A:  MSE:    Appearance: good hygiene  and appropriate attire  Attitude: cooperative and friendly  Consciousness: alert  Orientation: oriented to person, place, time, general circumstance  Memory: recent and remote memory intact  Attention/Concentration: intact during session  Psychomotor Activity:normal  Eye Contact: normal  Speech: normal rate and volume, well-articulated  Mood: anxious, mostly ok  Affect: euthymic and congruent  Perception: within normal limits  Thought Content: within normal limits  Thought Process: logical, coherent and goal-directed  Insight: good  Judgment: intact  Ability to understand instructions: Yes  Ability to respond meaningfully: Yes  Morbid Ideation: no   Suicide Assessment: no suicidal ideation, plan, or intent  Homicidal Ideation: no      A:      Diagnosis:    1.  Social anxiety disorder          Diagnosis Date    Blood pressure elevated without history of HTN 13/40/7141    Eosinophilic esophagitis 3/24/4609    Migraine without aura and without status migrainosus, not intractable 12/11/2017    New daily persistent headache 10/11/2016    Oropharyngeal dysphagia 3/25/2020       Plan:  Pt interventions:  Provided education on the use of medication to treat  anxiety, Discussed various factors related to the development and maintenance of  anxiety (including biological, cognitive, behavioral, and environmental factors), Trained in strategies for increasing balanced thinking, Trained in relaxation strategies, Conducted functional assessment, [place]-setting to identify pt's primary goals for [facility] visit / overall health, Supportive techniques and Provided Psychoeducation re: proprioceptive exposure     Pt Behavioral Change Plan:   See Pt Instructions    - the pt will continue to engage in real and imagined exposure activities using ACT model  - The pt will engage in proprioceptive exposure to reduce sensitivity to SOB and HR during anxiety episodes

## 2021-03-17 ENCOUNTER — VIRTUAL VISIT (OUTPATIENT)
Dept: PSYCHOLOGY | Age: 36
End: 2021-03-17
Payer: COMMERCIAL

## 2021-03-17 DIAGNOSIS — F40.10 SOCIAL ANXIETY DISORDER: Primary | ICD-10-CM

## 2021-03-17 PROCEDURE — 90832 PSYTX W PT 30 MINUTES: CPT | Performed by: PSYCHOLOGIST

## 2021-03-17 NOTE — PROGRESS NOTES
Behavioral Health Consultation  Steve Molina Psy.D. Clinical Psychologist/ Behavioral Health Consultant                                               3/17/2021    Time spent with Patient: 30 minutes  This is patient's fifth [facility] appointment. Reason for Consult:    Chief Complaint   Patient presents with    Anxiety     Referring Provider: No referring provider defined for this encounter. Feedback given to PCP. TELEHEALTH VISIT -- Audio/Visual (During PVZEV-12 public health emergency)  }  Pursuant to the emergency declaration under the [address] [place] [address], [facility] waiver authority and the [employer] Act, this Virtual Visit was conducted, with patient's consent, to reduce the patient's risk of exposure to COVID-19 and provide continuity of care for an established patient. Services were provided through a video synchronous discussion virtually to substitute for in-person clinic visit. Pt gave verbal informed consent to participate in telehealth services. Conducted a risk-benefit analysis and determined that the patient's presenting problems are consistent with the use of telepsychology. Determined that the patient has sufficient knowledge and skills in the use of technology enabling them to adequately benefit from telepsychology. It was determined that this patient was able to be properly treated without an in-person session. Patient verified that they were currently located at the [facility] address that was provided during registration.     Verified the following information:  Patient's identification: Yes  Patient location: [address] [place] [address] [address]   Patient's call back number: [phone]   Patient's emergency contact's name and number, as well as permission to contact them if needed: Extended Emergency Contact Information  Primary Emergency Contact: [address] Phone: [phone]  Relation: Parent  Secondary Emergency Contact: [address] [facility] Phone: [phone]  Mobile Phone: [phone]  Relation: Parent     Provider location: Onel Contreras:  The pt reported becoming hyperfocused on physical symptoms- has been monitoring heart rate and breathing   Friday night felt like a panic attack was coming on so took Ativan and helped- was able to sleep  Felt stuck, depressed at various periods during the day   The pt reported doing better today but has been feeling generally anxious    O:  The pt appears to have developed generalized anxiety after successful intervention for social anxiety due to increased checking and hyper self awareness. Will continue to utilize behavioral and cognitive techniques to help reduce anxious distress. A:  MSE:  Appearance: good hygiene  and appropriate attire  Attitude: cooperative and friendly  Consciousness: alert  Orientation: oriented to person, place, time, general circumstance  Memory: recent and remote memory intact  Attention/Concentration: intact during session  Psychomotor Activity: normal  Eye Contact: normal  Speech: normal rate and volume, well-articulated  Mood: anxious for the most part, ok today  Affect: congruent  Perception: within normal limits  Thought Content: within normal limits  Thought Process: logical, coherent and goal-directed  Insight: good  Judgment: intact  Ability to understand instructions: Yes  Ability to respond meaningfully: Yes  Morbid Ideation: no   Suicide Assessment: no suicidal ideation, plan, or intent  Homicidal Ideation: no    A:    Diagnosis:    1.  Social anxiety disorder          Diagnosis Date    Blood pressure elevated without history of HTN 49/47/3705    Eosinophilic esophagitis 9/04/6981    Migraine without aura and without status migrainosus, not intractable 12/11/2017    New daily persistent headache 10/11/2016    Oropharyngeal dysphagia 3/25/2020       Plan:  Pt interventions:

## 2021-03-24 ENCOUNTER — VIRTUAL VISIT (OUTPATIENT)
Dept: PSYCHOLOGY | Age: 36
End: 2021-03-24
Payer: COMMERCIAL

## 2021-03-24 DIAGNOSIS — F40.10 SOCIAL ANXIETY DISORDER: Primary | ICD-10-CM

## 2021-03-24 PROCEDURE — 90832 PSYTX W PT 30 MINUTES: CPT | Performed by: PSYCHOLOGIST

## 2021-03-24 NOTE — PROGRESS NOTES
Secondary Emergency Contact: [address] [facility] Phone: [phone]  Mobile Phone: [phone]  Relation: Parent     Provider location: [place] Tin[place]:  The pt reported that anxiety has reduced somewhat- doesn't feel trapped in anxiety cycle, not feeling like he is always about to have a panic attack  Has not taken Ativan over the last week   Reported some nights where falling asleep is difficult   Breathing techniques have been helpful, pt continues to utilize daily   The pt discussed wanting to have more control over anxiety, continues to \"check in\" to evaluate anxiety     O:  Anxiety seems to have decreased overall. The pt continues to \"check in\" and evaluate anxiety which is likely increasing anxiety. Focusing on defusion and expansion to reduce anxiety about anxiety. Breathing techniques have been helpful    A:  MSE:  Appearance: good hygiene  and appropriate attire  Attitude: cooperative and friendly  Consciousness: alert  Orientation: oriented to person, place, time, general circumstance  Memory: recent and remote memory intact  Attention/Concentration: intact during session  Psychomotor Activity:normal  Eye Contact: normal  Speech: normal rate and volume, well-articulated  Mood: anxious  Affect: congruent  Perception: within normal limits  Thought Content: within normal limits  Thought Process: logical, coherent and goal-directed  Insight: good  Judgment: intact  Ability to understand instructions: Yes  Ability to respond meaningfully: Yes  Morbid Ideation: no   Suicide Assessment: no suicidal ideation, plan, or intent  Homicidal Ideation: no      A:    Diagnosis:    1.  Social anxiety disorder          Diagnosis Date    Blood pressure elevated without history of HTN 13/16/1081    Eosinophilic esophagitis 2/14/0464    Migraine without aura and without status migrainosus, not intractable 12/11/2017    New daily persistent headache 10/11/2016    Oropharyngeal dysphagia 3/25/2020       Plan: Pt interventions:  Discussed various factors related to the development and maintenance of  anxiety (including biological, cognitive, behavioral, and environmental factors), Trained in strategies for increasing balanced thinking, Trained in relaxation strategies, Discussed self-care (sleep, nutrition, rewarding activities, social support, exercise), Conducted functional assessment, [place]-setting to identify pt's primary goals for [facility] visit / overall health and Supportive techniques    Pt Behavioral Change Plan:   See Pt Instructions  - Pt will watch videos recommended on defusion strategies  - Pt will continue breathing exercises

## 2021-04-06 ENCOUNTER — VIRTUAL VISIT (OUTPATIENT)
Dept: PSYCHOLOGY | Age: 36
End: 2021-04-06
Payer: COMMERCIAL

## 2021-04-06 DIAGNOSIS — F40.10 SOCIAL ANXIETY DISORDER: Primary | ICD-10-CM

## 2021-04-06 PROCEDURE — 90832 PSYTX W PT 30 MINUTES: CPT | Performed by: PSYCHOLOGIST

## 2021-04-06 NOTE — PROGRESS NOTES
Behavioral Health Consultation  Karla Romano Psy.D. Clinical Psychologist/ Behavioral Health Consultant  4/6/2021  10:43 AM    Time spent with Patient: 30 minutes  This is patient's seventh [facility] appointment. Reason for Consult:    Chief Complaint   Patient presents with    Anxiety     Referring Provider: No referring provider defined for this encounter. Feedback given to PCP. TELEHEALTH VISIT -- Audio/Visual (During DYXNC-89 public health emergency)  }  Pursuant to the emergency declaration under the [facility], [facility] waiver authority and the [employer] Act, this Virtual Visit was conducted, with patient's consent, to reduce the patient's risk of exposure to COVID-19 and provide continuity of care for an established patient. Services were provided through a video synchronous discussion virtually to substitute for in-person clinic visit. Pt gave verbal informed consent to participate in telehealth services. Conducted a risk-benefit analysis and determined that the patient's presenting problems are consistent with the use of telepsychology. Determined that the patient has sufficient knowledge and skills in the use of technology enabling them to adequately benefit from telepsychology. It was determined that this patient was able to be properly treated without an in-person session. Patient verified that they were currently located at the [facility] address that was provided during registration.     Verified the following information:  Patient's identification: Yes  Patient location: [address] [place] [address] [address]   Patient's call back number: [phone]   Patient's emergency contact's name and number, as well as permission to contact them if needed: Extended Emergency Contact Information  Primary Emergency Contact: [place] [address] Phone: [phone]  Relation: Parent Secondary Emergency Contact: [address] [facility] Phone: [phone]  Mobile Phone: [phone]  Relation: Parent     Provider location: [place]:  The pt has been feeling good overall   Physiological symptoms came up that caused increased anxiety (When he feels tight chest or SOB)   The pt stated that he is often worried about having a panic attack:  \"Can I do this without having a panic attack\"- going to the grocery, friends coming over  Worried about monthly meeting tomorrow - will it be as successful as last time, will I go through same anxiety loop     O:  Overall, sx of anxiety are improving. The pt continues to have some anxiety about having a panic attack/experiencing anxiety. Discussed specific situations where he may avoid due to concern of becoming anxious. Reflected on success of last meeting and highlighted skills that worked     A:  MSE:    Appearance: good hygiene  and appropriate attire  Attitude: cooperative and friendly  Consciousness: alert  Orientation: oriented to person, place, time, general circumstance  Memory: recent and remote memory intact  Attention/Concentration: intact during session  Psychomotor Activity:normal  Eye Contact: normal  Speech: normal rate and volume, well-articulated  Mood: ok, somewhat anxious   Affect: anxious and congruent  Perception: within normal limits  Thought Content: within normal limits  Thought Process: logical, coherent and goal-directed  Insight: good  Judgment: intact  Ability to understand instructions: Yes  Ability to respond meaningfully: Yes  Morbid Ideation: no   Suicide Assessment: no suicidal ideation, plan, or intent  Homicidal Ideation: no      A:    Diagnosis:    1.  Social anxiety disorder          Diagnosis Date    Blood pressure elevated without history of HTN 66/31/7947    Eosinophilic esophagitis 5/78/2916    Migraine without aura and without status migrainosus, not intractable 12/11/2017    New daily persistent headache

## 2021-04-14 ENCOUNTER — VIRTUAL VISIT (OUTPATIENT)
Dept: PSYCHOLOGY | Age: 36
End: 2021-04-14
Payer: COMMERCIAL

## 2021-04-14 DIAGNOSIS — F40.10 SOCIAL ANXIETY DISORDER: Primary | ICD-10-CM

## 2021-04-14 PROCEDURE — 90832 PSYTX W PT 30 MINUTES: CPT | Performed by: PSYCHOLOGIST

## 2021-04-14 NOTE — PROGRESS NOTES
Behavioral Health Consultation  Madai Brady Psy.D. Clinical Psychologist/ Behavioral Health Consultant  4/14/2021  12:24 PM    Time spent with Patient: 30 minutes  This is patient's eighth [facility] appointment. Reason for Consult:    Chief Complaint   Patient presents with    Anxiety     Referring Provider: No referring provider defined for this encounter. Feedback given to PCP. TELEHEALTH VISIT -- Audio/Visual (During OIDWU-46 public health emergency)  }  Pursuant to the emergency declaration under the [facility], [facility] waiver authority and the [employer] Act, this Virtual Visit was conducted, with patient's consent, to reduce the patient's risk of exposure to COVID-19 and provide continuity of care for an established patient. Services were provided through a video synchronous discussion virtually to substitute for in-person clinic visit. Pt gave verbal informed consent to participate in telehealth services. Conducted a risk-benefit analysis and determined that the patient's presenting problems are consistent with the use of telepsychology. Determined that the patient has sufficient knowledge and skills in the use of technology enabling them to adequately benefit from telepsychology. It was determined that this patient was able to be properly treated without an in-person session. Patient verified that they were currently located at the [facility] address that was provided during registration.     Verified the following information:  Patient's identification: Yes  Patient location: [address] [place] [address] [address]   Patient's call back number: [phone]   Patient's emergency contact's name and number, as well as permission to contact them if needed: Extended Emergency Contact Information  Primary Emergency Contact: [place] [address] Phone: [phone]  Relation: Parent Secondary Emergency Contact: [address] [facility] Phone: [phone]  Mobile Phone: [phone]  Relation: Parent     Provider location: [place]:  Last Wednesday monthly meeting went well, didn't feel like it was his best but did not have high anxiety   Boss complimented him   Then had great meeting after that onThursday   Feeling more tired- thinks it is more allergy related  Nighttime anxiety starts to creep up- not needing medication, feeling like he is anxious about not being anxious (should I be more anxious right now?)  Shifting attention has been helpful  Not causing any issues with sleep, 10-15 min to fall asleep  No changes in eating or appetite     O:  The pt has shown good response to CBT intervention for anxiety. Overall anxiety has reduced. Monthly meeting (highest anxiety trigger) went well, no rebound anxiety after. Will continue to follow pt for CBT/ACT  intervention     A:  MSE:  Appearance: good hygiene  and appropriate attire  Attitude: cooperative and friendly  Consciousness: alert  Orientation: oriented to person, place, time, general circumstance  Memory: recent and remote memory intact  Attention/Concentration: intact during session  Psychomotor Activity:normal  Eye Contact: normal  Speech: normal rate and volume, well-articulated  Mood: good  Affect: euthymic  Perception: within normal limits  Thought Content: within normal limits  Thought Process: logical, coherent and goal-directed  Insight: good  Judgment: intact  Ability to understand instructions: Yes  Ability to respond meaningfully: Yes  Morbid Ideation: no   Suicide Assessment: no suicidal ideation, plan, or intent  Homicidal Ideation: no      A:    Diagnosis:    1.  Social anxiety disorder          Diagnosis Date    Blood pressure elevated without history of HTN 12/08/7537    Eosinophilic esophagitis 8/41/2911    Migraine without aura and without status migrainosus, not intractable 12/11/2017    New daily persistent headache 10/11/2016    Oropharyngeal dysphagia 3/25/2020         Plan:  Pt interventions:  Trained in strategies for increasing balanced thinking, Discussed and set plan for behavioral activation, Trained in relaxation strategies, Established rapport, Conducted functional assessment, [place]-setting to identify pt's primary goals for [facility] visit / overall health and Supportive techniques    Pt Behavioral Change Plan:   See Pt Instructions  - The pt will utilize PMR for nighttime anxiety

## 2021-04-21 ENCOUNTER — VIRTUAL VISIT (OUTPATIENT)
Dept: PSYCHOLOGY | Age: 36
End: 2021-04-21
Payer: COMMERCIAL

## 2021-04-21 DIAGNOSIS — F40.10 SOCIAL ANXIETY DISORDER: Primary | ICD-10-CM

## 2021-04-21 PROCEDURE — 90832 PSYTX W PT 30 MINUTES: CPT | Performed by: PSYCHOLOGIST

## 2021-04-21 NOTE — PROGRESS NOTES
anxiety (including biological, cognitive, behavioral, and environmental factors), Trained in strategies for increasing balanced thinking, Discussed and set plan for behavioral activation, Trained in relaxation strategies and Discussed self-care (sleep, nutrition, rewarding activities, social support, exercise)    Pt Behavioral Change Plan:   See Pt Instructions  The pt will continue to utilize strategies that have been helpful in reducing anxiety

## 2021-04-29 ENCOUNTER — VIRTUAL VISIT (OUTPATIENT)
Dept: PSYCHOLOGY | Age: 36
End: 2021-04-29
Payer: COMMERCIAL

## 2021-04-29 DIAGNOSIS — F40.10 SOCIAL ANXIETY DISORDER: Primary | ICD-10-CM

## 2021-04-29 PROCEDURE — 90832 PSYTX W PT 30 MINUTES: CPT | Performed by: PSYCHOLOGIST

## 2021-04-29 NOTE — PROGRESS NOTES
Secondary Emergency Contact: [address] [facility] Phone: [phone]  Mobile Phone: [phone]  Relation: Parent     Provider location: [facility] Fees:  Last Wednesday had increased anxiety   Starting thinking about his upcoming birthday-comparing life to other people (wife, kids. Inda Brittle ), Had high anxiety reaction, took Ativan, Thursday and was able to enjoy weekend  Monday-Tuesday had some physical symptoms of anxiety but was \"ok\"   The pt discussed goals, values related to health/wellbeing, leisure as well as barriers to implementation     O:  The pt is currently experiencing high anxiety related to general life goals triggered by his upcoming birthday. The pt responded well to bullseye activity. Will continue to utilize this ACT to reduce existential anxiety by implementing creative action oriented behavior. A:  MSE:  Appearance: good hygiene  and appropriate attire  Attitude: cooperative and friendly  Consciousness: alert  Orientation: oriented to person, place, time, general circumstance  Memory: recent and remote memory intact  Attention/Concentration: intact during session  Psychomotor Activity:normal  Eye Contact: normal  Speech: normal rate and volume, well-articulated  Mood: anxious   Affect: anxious  Perception: within normal limits  Thought Content: within normal limits  Thought Process: logical, coherent and goal-directed  Insight: good  Judgment: intact  Ability to understand instructions: Yes  Ability to respond meaningfully: Yes  Morbid Ideation: no   Suicide Assessment: no suicidal ideation, plan, or intent  Homicidal Ideation: no      A:    Diagnosis:    1.  Social anxiety disorder          Diagnosis Date    Blood pressure elevated without history of HTN 49/19/7022    Eosinophilic esophagitis 6/75/9969    Migraine without aura and without status migrainosus, not intractable 12/11/2017    New daily persistent headache 10/11/2016    Oropharyngeal dysphagia 3/25/2020         Plan:  Pt interventions:  Trained in strategies for increasing balanced thinking, Provided education, Conducted functional assessment, [place]-setting to identify pt's primary goals for [facility] visit / overall health, Supportive techniques, Emphasized self-care as important for managing overall health and Provided Psychoeducation re: goal-values discrepancy and anxiety    Pt Behavioral Change Plan:   See Pt Instructions  The pt will complete Bullseye activity

## 2021-05-06 ENCOUNTER — VIRTUAL VISIT (OUTPATIENT)
Dept: PSYCHOLOGY | Age: 36
End: 2021-05-06
Payer: COMMERCIAL

## 2021-05-06 DIAGNOSIS — F40.10 SOCIAL ANXIETY DISORDER: Primary | ICD-10-CM

## 2021-05-06 PROCEDURE — 90832 PSYTX W PT 30 MINUTES: CPT | Performed by: PSYCHOLOGIST

## 2021-05-06 NOTE — PROGRESS NOTES
Secondary Emergency Contact: [address] [facility] Phone: [phone]  Mobile Phone: [phone]  Relation: Parent     Provider location: [place] Tinajero:  The last week has been good, busy at work   Monthly meeting today- feels less nervous about it, using all the strategies he has used in the past  Successes have been encouraging  Sleep was impaired last night after he \"encouraged himself to be nervous about the meeting today\"  Discussed Goals-Value worksheet  Focused on work and leisure    O:  The pt continues to perpetuate anxiety by worrying about \"not being anxious enough. \" The pt responds well to behavioral interventions. The pt also responded well to [employer] activity focused on action oriented behavior corresponding with values. A:  MSE:    Appearance: good hygiene  and appropriate attire  Attitude: cooperative and friendly  Consciousness: alert  Orientation: oriented to person, place, time, general circumstance  Memory: recent and remote memory intact  Attention/Concentration: intact during session  Psychomotor Activity:normal  Eye Contact: normal  Speech: normal rate and volume, well-articulated  Mood: \"ok\"  Affect: euthymic and anxious  Perception: within normal limits  Thought Content: within normal limits  Thought Process: logical, coherent and goal-directed  Insight: good  Judgment: intact  Ability to understand instructions: Yes  Ability to respond meaningfully: Yes  Morbid Ideation: no   Suicide Assessment: no suicidal ideation, plan, or intent  Homicidal Ideation: no      A:    Diagnosis:    1.  Social anxiety disorder          Diagnosis Date    Blood pressure elevated without history of HTN 72/29/0516    Eosinophilic esophagitis 6/12/5357    Migraine without aura and without status migrainosus, not intractable 12/11/2017    New daily persistent headache 10/11/2016    Oropharyngeal dysphagia 3/25/2020         Plan:  Pt interventions:  Trained in strategies for increasing balanced thinking, Discussed and set plan for behavioral activation, Trained in relaxation strategies, Conducted functional assessment, [place]-setting to identify pt's primary goals for [facility] visit / overall health, Supportive techniques and Emphasized self-care as important for managing overall health    Pt Behavioral Change Plan:   See Pt Instructions  - Continue to complete Bullseye activity focused on relationships   -Implement one goal from one quadrant

## 2021-05-11 NOTE — PATIENT INSTRUCTIONS
Strategies we have discussed:  Shifting from perfect/high performance focus  Sticking with here and now, current goals, content over delivery   Slowing things down     PMR, Breathing- Diaphragmatic and Paced, Being Active  Identifying anxiety and labeling it, Refocusing on task at hand, Defusion/expansion

## 2021-05-13 ENCOUNTER — VIRTUAL VISIT (OUTPATIENT)
Dept: PSYCHOLOGY | Age: 36
End: 2021-05-13
Payer: COMMERCIAL

## 2021-05-13 DIAGNOSIS — F40.10 SOCIAL ANXIETY DISORDER: Primary | ICD-10-CM

## 2021-05-13 PROCEDURE — 90832 PSYTX W PT 30 MINUTES: CPT | Performed by: PSYCHOLOGIST

## 2021-05-13 NOTE — PROGRESS NOTES
Behavioral Health Consultation  Karla Romano Psy.D. Clinical Psychologist/ Behavioral Health Consultant  5/13/2021  10:02 AM    Time spent with Patient: 30 minutes  This is patient's [facility] appointment. Reason for Consult:    Chief Complaint   Patient presents with    Anxiety     Referring Provider: No referring provider defined for this encounter. Feedback given to PCP. TELEHEALTH VISIT -- Audio/Visual (During THMFT-00 public health emergency)  }  Pursuant to the emergency declaration under the [address] [place] [address], [facility] waiver authority and the [employer] Act, this Virtual Visit was conducted, with patient's consent, to reduce the patient's risk of exposure to COVID-19 and provide continuity of care for an established patient. Services were provided through a video synchronous discussion virtually to substitute for in-person clinic visit. Pt gave verbal informed consent to participate in telehealth services. Conducted a risk-benefit analysis and determined that the patient's presenting problems are consistent with the use of telepsychology. Determined that the patient has sufficient knowledge and skills in the use of technology enabling them to adequately benefit from telepsychology. It was determined that this patient was able to be properly treated without an in-person session. Patient verified that they were currently located at the [facility] address that was provided during registration.     Verified the following information:  Patient's identification: Yes  Patient location: [address] [place] [address] [address]   Patient's call back number: [phone]   Patient's emergency contact's name and number, as well as permission to contact them if needed: Extended Emergency Contact Information  Primary Emergency Contact: [place] [address] Phone: [phone]  Relation: Parent daily persistent headache 10/11/2016    Oropharyngeal dysphagia 3/25/2020         Plan:  Pt interventions:  Discussed various factors related to the development and maintenance of  anxiety (including biological, cognitive, behavioral, and environmental factors), Trained in strategies for increasing balanced thinking, Discussed and set plan for behavioral activation, Trained in relaxation strategies, Discussed self-care (sleep, nutrition, rewarding activities, social support, exercise), Conducted functional assessment, [place]-setting to identify pt's primary goals for [facility] visit / overall health and Supportive techniques    Pt Behavioral Change Plan:     - Discussed reflecting on past success, then focusing on next steps  - and then what?  Strategy when implementing social goal to reduce anxious avoidance

## 2021-05-27 ENCOUNTER — VIRTUAL VISIT (OUTPATIENT)
Dept: PSYCHOLOGY | Age: 36
End: 2021-05-27
Payer: COMMERCIAL

## 2021-05-27 DIAGNOSIS — F40.10 SOCIAL ANXIETY DISORDER: Primary | ICD-10-CM

## 2021-05-27 PROCEDURE — 90832 PSYTX W PT 30 MINUTES: CPT | Performed by: PSYCHOLOGIST

## 2021-05-27 NOTE — PROGRESS NOTES
Behavioral Health Consultation  Kirk Pozo Psy.D. Clinical Psychologist/ Behavioral Health Consultant  5/27/2021  10:03 AM    Time spent with Patient: 30 minutes  This is patient's 13th [facility] appointment. Reason for Consult:    Chief Complaint   Patient presents with    Anxiety     Referring Provider: No referring provider defined for this encounter. Feedback given to PCP. TELEHEALTH VISIT -- Audio/Visual (During LIVYZ-75 public health emergency)  }  Pursuant to the emergency declaration under the [facility], [facility] waiver authority and the [employer] Act, this Virtual Visit was conducted, with patient's consent, to reduce the patient's risk of exposure to COVID-19 and provide continuity of care for an established patient. Services were provided through a video synchronous discussion virtually to substitute for in-person clinic visit. Pt gave verbal informed consent to participate in telehealth services. Conducted a risk-benefit analysis and determined that the patient's presenting problems are consistent with the use of telepsychology. Determined that the patient has sufficient knowledge and skills in the use of technology enabling them to adequately benefit from telepsychology. It was determined that this patient was able to be properly treated without an in-person session. Patient verified that they were currently located at the [facility] address that was provided during registration.     Verified the following information:  Patient's identification: Yes  Patient location: [address] [place] [address] [address]   Patient's call back number: [phone]   Patient's emergency contact's name and number, as well as permission to contact them if needed: Extended Emergency Contact Information  Primary Emergency Contact: [place] [address] Phone: [phone]  Relation: Parent Secondary Emergency Contact: [address] [facility] Phone: [phone]  Mobile Phone: [phone]  Relation: Parent     Provider location: [place]olyn Guerrero:  The pt reported that he has been doing well overall   Has 2 incidence of small anxiety- was able to \"find safe space\"  Felt more down yesterday, lost physical and emotional energy  Had meeting that was frustrating     No Ativan in the last 2 weeks     General anxiety over the last two weeks  3/10  Highest anxiety 5/10 after feeling more down        O:  The pt has been doing well overall. He has responded well to behavioral and cognitive interventions for anxiety. The pt does have some anxiety the increases when he thinks he should be feeling more anxious. This week the pt seems to be somewhat low mood. A:  MSE:    Appearance: good hygiene  and appropriate attire  Attitude: cooperative and friendly  Consciousness: alert  Orientation: oriented to person, place, time, general circumstance  Memory: recent and remote memory intact  Attention/Concentration: intact during session  Psychomotor Activity:normal  Eye Contact: normal  Speech: normal rate and volume, well-articulated  Mood: down   Affect: euthymic  Perception: within normal limits  Thought Content: within normal limits  Thought Process: logical, coherent and goal-directed  Insight: good  Judgment: intact  Ability to understand instructions: Yes  Ability to respond meaningfully: Yes  Morbid Ideation: no   Suicide Assessment: no suicidal ideation, plan, or intent  Homicidal Ideation: no      A:    Diagnosis:    1.  Social anxiety disorder          Diagnosis Date    Blood pressure elevated without history of HTN 73/11/0343    Eosinophilic esophagitis 6/14/8256    Migraine without aura and without status migrainosus, not intractable 12/11/2017    New daily persistent headache 10/11/2016    Oropharyngeal dysphagia 3/25/2020         Plan:  Pt interventions:  Discussed various factors related to the development and maintenance of  depression and anxiety (including biological, cognitive, behavioral, and environmental factors), Trained in strategies for increasing balanced thinking, Discussed and set plan for behavioral activation, Trained in relaxation strategies, Discussed self-care (sleep, nutrition, rewarding activities, social support, exercise), Conducted functional assessment, [place]-setting to identify pt's primary goals for [facility] visit / overall health, Supportive techniques and Emphasized self-care as important for managing overall health    Pt Behavioral Change Plan:     1. Action plan  2. Yenny Stalker behind the clouds  3. Am I on target?

## 2021-06-24 ENCOUNTER — VIRTUAL VISIT (OUTPATIENT)
Dept: PSYCHOLOGY | Age: 36
End: 2021-06-24
Payer: COMMERCIAL

## 2021-06-24 DIAGNOSIS — F40.10 SOCIAL ANXIETY DISORDER: Primary | ICD-10-CM

## 2021-06-24 PROCEDURE — 90832 PSYTX W PT 30 MINUTES: CPT | Performed by: PSYCHOLOGIST

## 2021-06-24 NOTE — PROGRESS NOTES
Behavioral Health Consultation  Pao Fernandes Psy.D. Psychologist  6/24/2021  10:05 AM EDT    Time spent with Patient: 30 minutes  This is patient's 15th [facility] appointment. Reason for Consult: anxiety   Referring Provider: Elizabeth Enciso MD  [address]    Pt provided informed consent for the behavioral health program. Discussed with patient the model of service, including the limits of confidentiality (e.g., abuse reporting, suicide intervention) and the nature of the [facility] approach (e.g., focused, targeted interventions; open communication with PCP). Pt indicated understanding. Feedback given to PCP. S:  The pt reported that he has been doing well  Feeling more motivation at work   Had [employer] day\" last Monday   Has not been going to the office yet, barriers keep coming up   Started going to yoga in the park  No changes to sleep or appetite   Has some social interaction over the last 2 weeks, friends/family  SUDS rating overall 3-4/10    O:  The pt has responded well to behavioral intervention. Anxiety has reduced significantly in frequency, duration, intensity and interference. The pt continues to focus on daily goals and value based living. At times, the pt is feeling so good that he \"forces\" himself to feel more anxious.  Intervention focused on acceptance/ defusion when anxiety comes up and set shifting in uncertainty    A:  MSE:  Appearance: good hygiene  and appropriate attire  Attitude: cooperative and friendly  Consciousness: alert  Orientation: oriented to person, place, time, general circumstance  Memory: recent and remote memory intact  Attention/Concentration: intact during session  Psychomotor Activity: normal  Eye Contact: normal  Speech: normal rate and volume, well-articulated  Mood: good   Affect: congruent  Perception: within normal limits  Thought Content: within normal limits  Thought Process: logical, coherent and goal-directed  Insight: good  Judgment: intact  Ability to understand instructions: Yes  Ability to respond meaningfully: Yes  Morbid Ideation: no   Suicide Assessment: no suicidal ideation, plan, or intent  Homicidal Ideation: no      No flowsheet data found. Interpretation of GRISELDA-7 score: 5-9 = mild anxiety, 10-14 = moderate anxiety, 15+ = severe anxiety. Recommend referral to behavioral health for scores 10 or greater. PHQ Scores 1/28/2021 3/25/2020 3/25/2019 12/11/2017   PHQ2 Score 0 0 0 0   PHQ9 Score 0 0 0 0     Interpretation of Total Score Depression Severity: 1-4 = Minimal depression, 5-9 = Mild depression, 10-14 = Moderate depression, 15-19 = Moderately severe depression, 20-27 = Severe depression    Diagnosis:    1. Social anxiety disorder        Patient Active Problem List   Diagnosis    Migraine without aura and without status migrainosus, not intractable    Blood pressure elevated without history of HTN    Multiple benign melanocytic nevi    Seasonal allergies    Eosinophilic esophagitis         Plan:  Pt interventions:  [place]-setting to identify pt's primary goals for [facility] visit / overall health, Supportive techniques, Emphasized self-care as important for managing overall health, Cognitive strategies to target anxiety/defusion, Discussed and set plan for behavioral activation and Motivational Interviewing to increase patient confidence and compliance with adhering to behavioral change plan.

## 2021-07-12 ENCOUNTER — VIRTUAL VISIT (OUTPATIENT)
Dept: PSYCHOLOGY | Age: 36
End: 2021-07-12
Payer: COMMERCIAL

## 2021-07-12 DIAGNOSIS — F40.10 SOCIAL ANXIETY DISORDER: Primary | ICD-10-CM

## 2021-07-12 PROCEDURE — 90832 PSYTX W PT 30 MINUTES: CPT | Performed by: PSYCHOLOGIST

## 2021-07-12 NOTE — PROGRESS NOTES
Behavioral Health Consultation  Melita Stack Psy.D. Psychologist  7/12/2021  10:10 AM EDT    Time spent with Patient: 30 minutes  This is patient's 16th [facility] appointment. Reason for Consult: anxiety   Referring Provider: Dalia Cleaning MD  [address]    Pt provided informed consent for the behavioral health program. Discussed with patient the model of service, including the limits of confidentiality (e.g., abuse reporting, suicide intervention) and the nature of the [facility] approach (e.g., focused, targeted interventions; open communication with PCP). Pt indicated understanding. Feedback given to PCP. S:  The pt has been doing well overall  Vacation was good, helped with motivation and energy about work   Moments of anxiety/sad-disconnection   Monthly meeting went well, no anxiety before leading up to the meeting or residual   Has been using choice-point when anxiety is higher   Has not yet been back to the office  Overall SUDS 4/10   Peak anxiety 6/10   Having conversation is helpful in higher anxious moments     O:  The pt has responded well to ACT intervention for anxiety. Continues to have some higher anxiety during the week but overall well managed. The pt would like to continue to follow up due to busy season at work coming up. Continued to reinforce skills previously discussed.        A:  MSE:  Appearance: good hygiene  and appropriate attire  Attitude: cooperative and friendly  Consciousness: alert  Orientation: oriented to person, place, time, general circumstance  Memory: recent and remote memory intact  Attention/Concentration: intact during session  Psychomotor Activity: normal  Eye Contact: normal  Speech: normal rate and volume, well-articulated  Mood: good   Affect: euthymic  Perception: within normal limits  Thought Content: within normal limits  Thought Process: logical, coherent and goal-directed  Insight: good  Judgment: intact  Ability to understand instructions: Yes  Ability to respond meaningfully: Yes  Morbid Ideation: no   Suicide Assessment: no suicidal ideation, plan, or intent  Homicidal Ideation: no      No flowsheet data found. Interpretation of GRISELDA-7 score: 5-9 = mild anxiety, 10-14 = moderate anxiety, 15+ = severe anxiety. Recommend referral to behavioral health for scores 10 or greater. PHQ Scores 1/28/2021 3/25/2020 3/25/2019 12/11/2017   PHQ2 Score 0 0 0 0   PHQ9 Score 0 0 0 0     Interpretation of Total Score Depression Severity: 1-4 = Minimal depression, 5-9 = Mild depression, 10-14 = Moderate depression, 15-19 = Moderately severe depression, 20-27 = Severe depression    Diagnosis:    No diagnosis found. Patient Active Problem List   Diagnosis    Migraine without aura and without status migrainosus, not intractable    Blood pressure elevated without history of HTN    Multiple benign melanocytic nevi    Seasonal allergies    Eosinophilic esophagitis         Plan:  Pt interventions:  [place]-setting to identify pt's primary goals for [facility] visit / overall health, Supportive techniques, Emphasized self-care as important for managing overall health, Discussed and set plan for behavioral activation and Discussed and problem-solved barriers in adhering to behavioral change plan.

## 2021-07-14 ENCOUNTER — TELEPHONE (OUTPATIENT)
Dept: INTERNAL MEDICINE CLINIC | Age: 36
End: 2021-07-14

## 2021-07-14 DIAGNOSIS — Z00.00 ANNUAL PHYSICAL EXAM: Primary | ICD-10-CM

## 2021-07-14 DIAGNOSIS — E55.9 VITAMIN D DEFICIENCY: ICD-10-CM

## 2021-07-14 NOTE — TELEPHONE ENCOUNTER
Pt is called into office to request  staff to order the standard labs before his appointment  On 7/23. Pt plans on having them completed on Monday 7/29. Please advise. Confirmed phone number.

## 2021-07-29 ENCOUNTER — VIRTUAL VISIT (OUTPATIENT)
Dept: PSYCHOLOGY | Age: 36
End: 2021-07-29
Payer: COMMERCIAL

## 2021-07-29 DIAGNOSIS — F40.10 SOCIAL ANXIETY DISORDER: Primary | ICD-10-CM

## 2021-07-29 PROCEDURE — 90832 PSYTX W PT 30 MINUTES: CPT | Performed by: PSYCHOLOGIST

## 2021-07-29 NOTE — PROGRESS NOTES
Behavioral Health Consultation  Yulissa Smith Psy.D. Clinical Psychologist/ Behavioral Health Consultant  7/29/2021  10:33 AM    Time spent with Patient: 30 minutes  This is patient's 17th [facility] appointment. Reason for Consult:    Chief Complaint   Patient presents with    Anxiety     Referring Provider: No referring provider defined for this encounter. Feedback given to PCP. TELEHEALTH VISIT -- Audio/Visual (During QRHVX-70 public health emergency)  }  Pursuant to the emergency declaration under the [facility], [facility] waiver authority and the [employer] Act, this Virtual Visit was conducted, with patient's consent, to reduce the patient's risk of exposure to COVID-19 and provide continuity of care for an established patient. Services were provided through a video synchronous discussion virtually to substitute for in-person clinic visit. Pt gave verbal informed consent to participate in telehealth services. Conducted a risk-benefit analysis and determined that the patient's presenting problems are consistent with the use of telepsychology. Determined that the patient has sufficient knowledge and skills in the use of technology enabling them to adequately benefit from telepsychology. It was determined that this patient was able to be properly treated without an in-person session. Patient verified that they were currently located at the [facility] address that was provided during registration.     Verified the following information:  Patient's identification: Yes  Patient location: [address] [place] [address] [address]   Patient's call back number: [phone]   Patient's emergency contact's name and number, as well as permission to contact them if needed: Extended Emergency Contact Information  Primary Emergency Contact: [place] [address] Phone: [phone]  Relation: Parent  Secondary Emergency Contact: [address] [facility] Phone: [phone]  Mobile Phone: [phone]  Relation: Parent     Provider location: Georgie Fordle:  Busy season at work has ramped up over the last 3 weeks   The pt has been balancing going out, not working 12hr days, taking breaks, doing yoga   Feeling more stress than anxiety   Overall SUDs rating 3-4/10   Peak anxiety (6-10), physical sensations, worrying about spiraling  Being able to label/identify, goal oriented behavior, here and now thinking  Engaging in social activities without high anxiety pre-during-post       O:  The pt has responded well to CBT intervention for anxiety. Overall anxiety is minimal and anxious spikes are low in frequency and duration and moderate in intensity. There is almost no interference to daily functioning related to anxiety. Improved communication and social interactions. The pt has been using skill maintenance techniques effectively especially during stressful time at work. Discussed tapering follow up to 1 month, pt agrees. A:  MSE:  Appearance: good hygiene  and appropriate attire  Attitude: cooperative and friendly  Consciousness: alert  Orientation: oriented to person, place, time, general circumstance  Memory: recent and remote memory intact  Attention/Concentration: intact during session  Psychomotor Activity:normal  Eye Contact: normal  Speech: normal rate and volume, well-articulated  Mood: good   Affect: euthymic  Perception: within normal limits  Thought Content: within normal limits  Thought Process: logical, coherent and goal-directed  Insight: good  Judgment: intact  Ability to understand instructions: Yes  Ability to respond meaningfully: Yes  Morbid Ideation: no   Suicide Assessment: no suicidal ideation, plan, or intent  Homicidal Ideation: no      A:    Diagnosis:    1.  Social anxiety disorder          Diagnosis Date    Blood pressure elevated without history of HTN 12/11/2017    Eosinophilic esophagitis 4/16/6262    Migraine without aura and without status migrainosus, not intractable 12/11/2017    New daily persistent headache 10/11/2016    Oropharyngeal dysphagia 3/25/2020       Plan:  Pt interventions:  Discussed various factors related to the development and maintenance of  anxiety (including biological, cognitive, behavioral, and environmental factors), Trained in strategies for increasing balanced thinking, Trained in relaxation strategies, Conducted functional assessment, [place]-setting to identify pt's primary goals for [facility] visit / overall health, Supportive techniques, Emphasized self-care as important for managing overall health and Collaboratively set goals with pt re: relapse prevention

## 2021-07-30 DIAGNOSIS — Z00.00 ANNUAL PHYSICAL EXAM: ICD-10-CM

## 2021-07-30 DIAGNOSIS — E55.9 VITAMIN D DEFICIENCY: ICD-10-CM

## 2021-07-30 LAB
A/G RATIO: 2 (ref 1.1–2.2)
ALBUMIN SERPL-MCNC: 4.6 G/DL (ref 3.4–5)
ALP BLD-CCNC: 68 U/L (ref 40–129)
ALT SERPL-CCNC: 19 U/L (ref 10–40)
ANION GAP SERPL CALCULATED.3IONS-SCNC: 14 MMOL/L (ref 3–16)
AST SERPL-CCNC: 18 U/L (ref 15–37)
BASOPHILS ABSOLUTE: 0 K/UL (ref 0–0.2)
BASOPHILS RELATIVE PERCENT: 0.6 %
BILIRUB SERPL-MCNC: 0.4 MG/DL (ref 0–1)
BILIRUBIN URINE: NEGATIVE
BLOOD, URINE: NEGATIVE
BUN BLDV-MCNC: 8 MG/DL (ref 7–20)
CALCIUM SERPL-MCNC: 9.4 MG/DL (ref 8.3–10.6)
CHLORIDE BLD-SCNC: 99 MMOL/L (ref 99–110)
CHOLESTEROL, TOTAL: 158 MG/DL (ref 0–199)
CLARITY: CLEAR
CO2: 25 MMOL/L (ref 21–32)
COLOR: YELLOW
CREAT SERPL-MCNC: 0.8 MG/DL (ref 0.9–1.3)
EOSINOPHILS ABSOLUTE: 0.1 K/UL (ref 0–0.6)
EOSINOPHILS RELATIVE PERCENT: 1.3 %
GFR AFRICAN AMERICAN: >60
GFR NON-AFRICAN AMERICAN: >60
GLOBULIN: 2.3 G/DL
GLUCOSE BLD-MCNC: 91 MG/DL (ref 70–99)
GLUCOSE URINE: NEGATIVE MG/DL
HCT VFR BLD CALC: 43.9 % (ref 40.5–52.5)
HDLC SERPL-MCNC: 53 MG/DL (ref 40–60)
HEMOGLOBIN: 15.1 G/DL (ref 13.5–17.5)
KETONES, URINE: NEGATIVE MG/DL
LDL CHOLESTEROL CALCULATED: 94 MG/DL
LEUKOCYTE ESTERASE, URINE: NEGATIVE
LYMPHOCYTES ABSOLUTE: 1.7 K/UL (ref 1–5.1)
LYMPHOCYTES RELATIVE PERCENT: 35.1 %
MCH RBC QN AUTO: 30.4 PG (ref 26–34)
MCHC RBC AUTO-ENTMCNC: 34.3 G/DL (ref 31–36)
MCV RBC AUTO: 88.8 FL (ref 80–100)
MICROSCOPIC EXAMINATION: NORMAL
MONOCYTES ABSOLUTE: 0.5 K/UL (ref 0–1.3)
MONOCYTES RELATIVE PERCENT: 10 %
NEUTROPHILS ABSOLUTE: 2.5 K/UL (ref 1.7–7.7)
NEUTROPHILS RELATIVE PERCENT: 53 %
NITRITE, URINE: NEGATIVE
PDW BLD-RTO: 13.3 % (ref 12.4–15.4)
PH UA: 7 (ref 5–8)
PLATELET # BLD: 264 K/UL (ref 135–450)
PMV BLD AUTO: 8.1 FL (ref 5–10.5)
POTASSIUM SERPL-SCNC: 4.1 MMOL/L (ref 3.5–5.1)
PROTEIN UA: NEGATIVE MG/DL
RBC # BLD: 4.95 M/UL (ref 4.2–5.9)
SODIUM BLD-SCNC: 138 MMOL/L (ref 136–145)
SPECIFIC GRAVITY UA: 1.01 (ref 1–1.03)
TOTAL PROTEIN: 6.9 G/DL (ref 6.4–8.2)
TRIGL SERPL-MCNC: 55 MG/DL (ref 0–150)
TSH SERPL DL<=0.05 MIU/L-ACNC: 1.9 UIU/ML (ref 0.27–4.2)
URINE REFLEX TO CULTURE: NORMAL
URINE TYPE: NORMAL
UROBILINOGEN, URINE: 0.2 E.U./DL
VITAMIN D 25-HYDROXY: 44.9 NG/ML
VLDLC SERPL CALC-MCNC: 11 MG/DL
WBC # BLD: 4.7 K/UL (ref 4–11)

## 2021-08-26 ENCOUNTER — VIRTUAL VISIT (OUTPATIENT)
Dept: PSYCHOLOGY | Age: 36
End: 2021-08-26
Payer: COMMERCIAL

## 2021-08-26 DIAGNOSIS — F40.10 SOCIAL ANXIETY DISORDER: Primary | ICD-10-CM

## 2021-08-26 PROCEDURE — 90832 PSYTX W PT 30 MINUTES: CPT | Performed by: PSYCHOLOGIST

## 2021-08-26 NOTE — PROGRESS NOTES
Behavioral Health Consultation  Huan Elkins Psy.D. Clinical Psychologist/ Behavioral Health Consultant  8/26/2021  11:07 AM    Time spent with Patient: 30 minutes  This is patient's 18th [facility] appointment. Reason for Consult:    Chief Complaint   Patient presents with    Anxiety     Referring Provider: No referring provider defined for this encounter. Feedback given to PCP. TELEHEALTH VISIT -- Audio/Visual (During JAXEU-21 public health emergency)  }  Pursuant to the emergency declaration under the [facility], [facility] waiver authority and the [employer] Act, this Virtual Visit was conducted, with patient's consent, to reduce the patient's risk of exposure to COVID-19 and provide continuity of care for an established patient. Services were provided through a video synchronous discussion virtually to substitute for in-person clinic visit. Pt gave verbal informed consent to participate in telehealth services. Conducted a risk-benefit analysis and determined that the patient's presenting problems are consistent with the use of telepsychology. Determined that the patient has sufficient knowledge and skills in the use of technology enabling them to adequately benefit from telepsychology. It was determined that this patient was able to be properly treated without an in-person session. Patient verified that they were currently located at the [facility] address that was provided during registration.     Verified the following information:  Patient's identification: Yes  Patient location: [address] [place] [address] [address]   Patient's call back number: [phone]   Patient's emergency contact's name and number, as well as permission to contact them if needed: Extended Emergency Contact Information  Primary Emergency Contact: [place] [address] Phone: [phone]  Relation: Parent  Secondary Emergency Contact: [address] [facility] Phone: [phone]  Mobile Phone: [phone]  Relation: Parent     Provider location: [place]:  The pt has been doing well overall   Work has been overwhelming   Feeling stressed more than anxious but had a moment where anxiety increased- recognized when he felt a little flat so he went on a bike ride  Stress about work has not been seeping into other areas  Trying to let go of perfectionism     O:  The pt has responded well to behavioral intervention. He continues to integrate skills well. Overall anxiety is well controlled. Will follow up in 1 month to check on maintenance of gains     A:  MSE:    Appearance: good hygiene  and appropriate attire  Attitude: cooperative and friendly  Consciousness: alert  Orientation: oriented to person, place, time, general circumstance  Memory: recent and remote memory intact  Attention/Concentration: intact during session  Psychomotor Activity:normal  Eye Contact: normal  Speech: normal rate and volume, well-articulated  Mood: good  Affect: euthymic  Perception: within normal limits  Thought Content: within normal limits  Thought Process: logical, coherent and goal-directed  Insight: good  Judgment: intact  Ability to understand instructions: Yes  Ability to respond meaningfully: Yes  Morbid Ideation: no   Suicide Assessment: no suicidal ideation, plan, or intent  Homicidal Ideation: no    A:    Diagnosis:    1.  Social anxiety disorder          Diagnosis Date    Blood pressure elevated without history of HTN 64/82/0145    Eosinophilic esophagitis 5/57/9562    Migraine without aura and without status migrainosus, not intractable 12/11/2017    New daily persistent headache 10/11/2016    Oropharyngeal dysphagia 3/25/2020         Plan:  Pt interventions:  Discussed various factors related to the development and maintenance of  anxiety and stress (including biological, cognitive, behavioral, and environmental factors), Discussed self-care (sleep, nutrition, rewarding activities, social support, exercise), Conducted functional assessment, [place]-setting to identify pt's primary goals for [facility] visit / overall health, Supportive techniques, Emphasized self-care as important for managing overall health and Emphasized importance of regular practice of relaxation strategies to target / promote anxiety managment

## 2021-08-27 ENCOUNTER — OFFICE VISIT (OUTPATIENT)
Dept: INTERNAL MEDICINE CLINIC | Age: 36
End: 2021-08-27
Payer: COMMERCIAL

## 2021-08-27 VITALS
WEIGHT: 142 LBS | DIASTOLIC BLOOD PRESSURE: 82 MMHG | HEIGHT: 70 IN | SYSTOLIC BLOOD PRESSURE: 144 MMHG | BODY MASS INDEX: 20.33 KG/M2

## 2021-08-27 DIAGNOSIS — Z00.00 PE (PHYSICAL EXAM), ANNUAL: Primary | ICD-10-CM

## 2021-08-27 DIAGNOSIS — K20.0 EOSINOPHILIC ESOPHAGITIS: ICD-10-CM

## 2021-08-27 PROCEDURE — 99395 PREV VISIT EST AGE 18-39: CPT | Performed by: INTERNAL MEDICINE

## 2021-08-27 RX ORDER — LORAZEPAM 0.5 MG/1
0.5 TABLET ORAL EVERY 8 HOURS PRN
COMMUNITY
End: 2021-10-19 | Stop reason: SDUPTHER

## 2021-08-27 NOTE — PROGRESS NOTES
Annual Wellness Visit     Patient:  Germania White                                               : 1985  Age: 29 y.o. MRN: <[id number]>  Date : 2021      CHIEF COMPLAINT: Germania White is a 29 y.o. male who presents for : Physical exam    1. Eosinophilic esophagitis  History of eosinophilic esophagitis now controlled with Prilosec has not had a repeat endoscopy  - Estefany Yepez MD, Gastroenterology, [place]    2.  PE (physical exam), annual  He feels good is exercising regularly denies any chest pain shortness of breath headache or any exacerbation of his esophagitis        Patient Active Problem List    Diagnosis Date Noted    Eosinophilic esophagitis     Seasonal allergies 2020    Multiple benign melanocytic nevi 2020    Migraine without aura and without status migrainosus, not intractable 2017    Blood pressure elevated without history of HTN 2017       Constitutional:  Denies fever or chills   Eyes:  Denies change in visual acuity   HENT:  Denies nasal congestion or sore throat   Respiratory:  Denies cough or shortness of breath   Cardiovascular:  Denies chest pain or edema   GI:  Denies abdominal pain, nausea, vomiting, bloody stools or diarrhea   :  Denies dysuria   Musculoskeletal:  Denies back pain or joint pain   Integument:  Denies rash   Neurologic:  Denies headache, focal weakness or sensory changes   Endocrine:  Denies polyuria or polydipsia   Lymphatic:  Denies swollen glands   Psychiatric:  Denies depression or anxiety     Past Medical History:        Diagnosis Date    Blood pressure elevated without history of HTN     Eosinophilic esophagitis     Migraine without aura and without status migrainosus, not intractable 2017    New daily persistent headache 10/11/2016    Oropharyngeal dysphagia 3/25/2020       Past Surgical History:        Procedure Laterality Date    HERNIA REPAIR      UPPER GASTROINTESTINAL ENDOSCOPY N/A 3/24/2020    EGD DILATION BALLOON performed by Rg Amin MD at [facility] N/A 3/24/2020    EGD BIOPSY performed by Rg Amin MD at [address] ENDOSCOPY       Family History:  Family History   Problem Relation Age of Onset    Depression Mother     Cancer Paternal Uncle     Cancer Maternal Grandmother     Cancer Maternal Grandfather     Cancer Paternal Grandmother     Cancer Paternal Grandfather     Diabetes Other        Social History:  Social History     Socioeconomic History    Marital status: Single     Spouse name: None    Number of children: None    Years of education: None    Highest education level: None   Occupational History    None   Tobacco Use    Smoking status: Never Smoker    Smokeless tobacco: Never Used   Vaping Use    Vaping Use: Never used   Substance and Sexual Activity    Alcohol use: Yes     Alcohol/week: 5.0 standard drinks     Types: 5 Cans of beer per week     Comment: 2 drinks daily     Drug use: No    Sexual activity: None   Other Topics Concern    None   Social History Narrative    None     Social Determinants of Health     Financial Resource Strain:     Difficulty of Paying Living Expenses:    Food Insecurity:     Worried About Running Out of Food in the Last Year:     Ran Out of Food in the Last Year:    Transportation Needs:     Lack of Transportation (Medical):      Lack of Transportation (Non-Medical):    Physical Activity:     Days of Exercise per Week:     Minutes of Exercise per Session:    Stress:     Feeling of Stress :    Social Connections:     Frequency of Communication with Friends and Family:     Frequency of Social Gatherings with Friends and Family:     Attends Church Services:     Active Member of Clubs or Organizations:     Attends Club or Organization Meetings:     Marital Status:    Intimate Partner Violence:     Fear of Current or Ex-Partner:     07/30/2021           Lab Results   Component Value Date    SHFJHEVQ87 697 12/09/2016                                                             BMP:    Lab Results   Component Value Date     07/30/2021    K 4.1 07/30/2021    CL 99 07/30/2021    CO2 25 07/30/2021       LFT's:   Lab Results   Component Value Date    ALT 19 07/30/2021    AST 18 07/30/2021    ALKPHOS 68 07/30/2021    BILITOT 0.4 07/30/2021       Lipids:   Lab Results   Component Value Date    CHOL 158 07/30/2021    HDL 53 07/30/2021    LDLCALC 94 07/30/2021    TRIG 55 07/30/2021       INR: No results found for: INR, PROTIME    U/A:  Lab Results   Component Value Date    LABMICR Not Indicated 07/30/2021        No results found for: LABA1C     Lab Results   Component Value Date    CREATININE 0.8 (L) 07/30/2021       -----------------------------------------------------------------     Assessment/Plan:   1. Eosinophilic esophagitis  This problem is stable continue present meds follow-up for repeat esophagogastroduodenoscopy  - LO - Sreekanth West MD, Gastroenterology, [facility]    2.  PE (physical exam), annual  Check screening labs continue diet and exercise to get a flu shot when available and a booster Covid when available follow-up yearly

## 2021-08-28 ENCOUNTER — PATIENT MESSAGE (OUTPATIENT)
Dept: INTERNAL MEDICINE CLINIC | Age: 36
End: 2021-08-28

## 2021-08-31 NOTE — TELEPHONE ENCOUNTER
From: Nikole Byrd  To: Beverlee Gowers, MD  Sent: 8/28/2021 10:00 AM EDT  Subject: Non-Urgent Medical Question    Hi Dr. Bianca Romeo - Can you please provide a referral to a dermatologist? I'd like to start getting an annual check.     Thanks,    Briseida Myrick

## 2021-09-15 LAB
CHOLESTEROL, TOTAL: 158 MG/DL (ref 0–199)
GLUCOSE BLD-MCNC: 89 MG/DL (ref 70–99)
HDLC SERPL-MCNC: 46 MG/DL (ref 40–60)
LDL CHOLESTEROL CALCULATED: 99 MG/DL
TRIGL SERPL-MCNC: 67 MG/DL (ref 0–150)

## 2021-10-04 ENCOUNTER — VIRTUAL VISIT (OUTPATIENT)
Dept: PSYCHOLOGY | Age: 36
End: 2021-10-04
Payer: COMMERCIAL

## 2021-10-04 DIAGNOSIS — F40.10 SOCIAL ANXIETY DISORDER: Primary | ICD-10-CM

## 2021-10-04 PROCEDURE — 90832 PSYTX W PT 30 MINUTES: CPT | Performed by: PSYCHOLOGIST

## 2021-10-04 NOTE — PROGRESS NOTES
Behavioral Health Consultation  Carmen Ramos Psy.D. Clinical Psychologist/ Behavioral Health Consultant  10/4/2021  10:03 AM    Time spent with Patient: 30 minutes  This is patient's 19th [facility] appointment. Reason for Consult:    Chief Complaint   Patient presents with    Anxiety     Referring Provider: No referring provider defined for this encounter. Feedback given to PCP. TELEHEALTH VISIT -- Audio/Visual (During NTIHO-34 public health emergency)  }  Pursuant to the emergency declaration under the [facility] waiver authority and the [employer] Act, this Virtual Visit was conducted, with patient's consent, to reduce the patient's risk of exposure to COVID-19 and provide continuity of care for an established patient. Services were provided through a video synchronous discussion virtually to substitute for in-person clinic visit. Pt gave verbal informed consent to participate in telehealth services. Conducted a risk-benefit analysis and determined that the patient's presenting problems are consistent with the use of telepsychology. Determined that the patient has sufficient knowledge and skills in the use of technology enabling them to adequately benefit from telepsychology. It was determined that this patient was able to be properly treated without an in-person session. Patient verified that they were currently located at the [facility] address that was provided during registration.     Verified the following information:  Patient's identification: Yes  Patient location: [address] [place] [address] [address]   Patient's call back number: [phone]   Patient's emergency contact's name and number, as well as permission to contact them if needed: Extended Emergency Contact Information  Primary Emergency Contact: [place] [address] Phone: [phone]  Relation: Parent  Secondary Emergency Contact: [address] [facility] Phone: [phone]  Mobile Phone: [phone]  Relation: Parent     Provider location: Mery Maher:  The pt reported some increase in anxiety   Niece was born premature (NICU for week)   Had some chest tightness/pain - has not resolved since niece came home (3 weeks ago)   Took 2-3 Ativan over the last month  Feeling out of control, helpless    Constant pain; increases with social interaction since he is worried about interference/thinking about it  Gets worse as the week progresses then better on weekends   Sleeping well but waking early     O:  The pt reported increase in anxiety- worry and physiological experience after worry about his niece in the hospital. Sensation has not resolved since his niece is home. 1. Observe-expand  2. Worry time     A:  MSE:    Appearance: good hygiene  and appropriate attire  Attitude: cooperative and friendly  Consciousness: alert  Orientation: oriented to person, place, time, general circumstance  Memory: recent and remote memory intact  Attention/Concentration: intact during session  Psychomotor Activity:normal  Eye Contact: normal  Speech: normal rate and volume, well-articulated  Mood: anxious   Affect: congruent   Perception: within normal limits  Thought Content: within normal limits  Thought Process: logical, coherent and goal-directed  Insight: good  Judgment: intact  Ability to understand instructions: Yes  Ability to respond meaningfully: Yes  Morbid Ideation: no   Suicide Assessment: no suicidal ideation, plan, or intent  Homicidal Ideation: no      A:    Diagnosis:    1.  Social anxiety disorder          Diagnosis Date    Blood pressure elevated without history of HTN 11/40/5642    Eosinophilic esophagitis 3/84/1949    Migraine without aura and without status migrainosus, not intractable 12/11/2017    New daily persistent headache 10/11/2016    Oropharyngeal dysphagia 3/25/2020 Plan:  Pt interventions:  Discussed various factors related to the development and maintenance of  anxiety (including biological, cognitive, behavioral, and environmental factors), Trained in strategies for increasing balanced thinking, Discussed and set plan for behavioral activation, Trained in relaxation strategies, Motivational Interviewing to increase patient confidence and compliance with adhering to behavioral change plan, Conducted functional assessment, [place]-setting to identify pt's primary goals for CARRIEISAURA PEÑA [facility] visit / overall health, Supportive techniques and Emphasized self-care as important for managing overall health

## 2021-10-08 ENCOUNTER — VIRTUAL VISIT (OUTPATIENT)
Dept: PSYCHOLOGY | Age: 36
End: 2021-10-08
Payer: COMMERCIAL

## 2021-10-08 DIAGNOSIS — F40.10 SOCIAL ANXIETY DISORDER: Primary | ICD-10-CM

## 2021-10-08 PROCEDURE — 90832 PSYTX W PT 30 MINUTES: CPT | Performed by: PSYCHOLOGIST

## 2021-10-08 NOTE — PROGRESS NOTES
Behavioral Health Consultation  Abel Modi Psy.D. Clinical Psychologist/ Behavioral Health Consultant  10/8/2021  9:55 AM    Time spent with Patient: 28 minutes  This is patient's 20th [facility] appointment. Reason for Consult:    Chief Complaint   Patient presents with    Anxiety     Referring Provider: No referring provider defined for this encounter. Feedback given to PCP. TELEHEALTH VISIT -- Audio/Visual (During PLUOK-23 public health emergency)  }  Pursuant to the emergency declaration under the [address] [place] [address] waiver authority and the [employer] Act, this Virtual Visit was conducted, with patient's consent, to reduce the patient's risk of exposure to COVID-19 and provide continuity of care for an established patient. Services were provided through a video synchronous discussion virtually to substitute for in-person clinic visit. Pt gave verbal informed consent to participate in telehealth services. Conducted a risk-benefit analysis and determined that the patient's presenting problems are consistent with the use of telepsychology. Determined that the patient has sufficient knowledge and skills in the use of technology enabling them to adequately benefit from telepsychology. It was determined that this patient was able to be properly treated without an in-person session. Patient verified that they were currently located at the [facility] address that was provided during registration.     Verified the following information:  Patient's identification: Yes  Patient location: [address] [place] [address] [address]   Patient's call back number: [phone]   Patient's emergency contact's name and number, as well as permission to contact them if needed: Extended Emergency Contact Information  Primary Emergency Contact: [place] [address] Phone: [phone]  Relation: Parent  Secondary Emergency Contact: [address] [facility] Phone: [phone]  Mobile Phone: [phone]  Relation: Parent     Provider location: Ernesto Burgos:  Things have improved somewhat   Expansion technique has been helpful for managing chest tightness  Wakes early and is \"Waiting for the sensation to come\"  Went into the office last Friday  Is waking up and checking into physical sensation  Not interfering with sleep/eating (maybe slightly less healthy)  Buildup and first 30 min of social interaction have increased anxiety- Focused on the chest tightness but then releases after trying to be present     O:  The pt continues to have increased sensation of anxiety- chest tightness/pain. Expansion has been somewhat helpful in managing control. The pt seems to be exacerbating by focusing \"waiting\" or inviting the sensation to come. Discussed behavioral routine shift- waking and automatically doing yoga to focus on other body sensation. A:  MSE:    Appearance: good hygiene  and appropriate attire  Attitude: cooperative and friendly  Consciousness: alert  Orientation: oriented to person, place, time, general circumstance  Memory: recent and remote memory intact  Attention/Concentration: intact during session  Psychomotor Activity:normal  Eye Contact: normal  Speech: normal rate and volume, well-articulated  Mood: anxious but improved   Affect: congruent  Perception: within normal limits  Thought Content: within normal limits  Thought Process: logical, coherent and goal-directed  Insight: good  Judgment: intact  Ability to understand instructions: Yes  Ability to respond meaningfully: Yes  Morbid Ideation: no   Suicide Assessment: no suicidal ideation, plan, or intent  Homicidal Ideation: no      A:    Diagnosis:    1.  Social anxiety disorder          Diagnosis Date    Blood pressure elevated without history of HTN 97/91/3717    Eosinophilic esophagitis 4/65/2182    Migraine without aura and without status migrainosus, not intractable 12/11/2017    New daily persistent headache 10/11/2016    Oropharyngeal dysphagia 3/25/2020         Plan:  Pt interventions:  Discussed various factors related to the development and maintenance of  anxiety (including biological, cognitive, behavioral, and environmental factors), Trained in strategies for increasing balanced thinking, Discussed and set plan for behavioral activation, Trained in relaxation strategies, Conducted functional assessment, [place]-setting to identify pt's primary goals for [facility] visit / overall health, Supportive techniques and Emphasized self-care as important for managing overall health

## 2021-10-15 ENCOUNTER — VIRTUAL VISIT (OUTPATIENT)
Dept: PSYCHOLOGY | Age: 36
End: 2021-10-15
Payer: COMMERCIAL

## 2021-10-15 DIAGNOSIS — F40.10 SOCIAL ANXIETY DISORDER: Primary | ICD-10-CM

## 2021-10-15 PROCEDURE — 90832 PSYTX W PT 30 MINUTES: CPT | Performed by: PSYCHOLOGIST

## 2021-10-15 NOTE — PROGRESS NOTES
Behavioral Health Consultation  Marva Elkins Psy.D. Clinical Psychologist/ Behavioral Health Consultant  10/15/2021  9:08 AM    Time spent with Patient: 30 minutes  This is patient's 21st [facility] appointment. Reason for Consult:    Chief Complaint   Patient presents with    Anxiety     Referring Provider: No referring provider defined for this encounter. Feedback given to PCP. TELEHEALTH VISIT -- Audio/Visual (During FINQX-65 public health emergency)  }  Pursuant to the emergency declaration under the [address] [place] [address], [facility] waiver authority and the [employer] Act, this Virtual Visit was conducted, with patient's consent, to reduce the patient's risk of exposure to COVID-19 and provide continuity of care for an established patient. Services were provided through a video synchronous discussion virtually to substitute for in-person clinic visit. Pt gave verbal informed consent to participate in telehealth services. Conducted a risk-benefit analysis and determined that the patient's presenting problems are consistent with the use of telepsychology. Determined that the patient has sufficient knowledge and skills in the use of technology enabling them to adequately benefit from telepsychology. It was determined that this patient was able to be properly treated without an in-person session. Patient verified that they were currently located at the [facility] address that was provided during registration.     Verified the following information:  Patient's identification: Yes  Patient location: [address] [place] [address] [address]   Patient's call back number: [phone]   Patient's emergency contact's name and number, as well as permission to contact them if needed: Extended Emergency Contact Information  Primary Emergency Contact: [place] [address] Phone: [phone]  Relation: Parent  Secondary Emergency Contact: [address] [facility] Phone: [phone]  Mobile Phone: [phone]  Relation: Parent     Provider location: Hima Almaguer:  Has been feeling better over the last week   Acceptance of chest tightness and strange pain  Feeling happier this week , focusing more on work   [facility] into office Wed afternoon, waking up and going straight to relaxing activity   Continues to feel the sensation but not letting it get in his way    O:  The pt continues to experience increased sensation of anxiety. The pt has been focusing on acceptance, expansion and focused on routine changes. Recommended that the pt schedule visit to see PCP if sensation doesn't change over the weekend. Likely anxiety but given the pt's willingness, response to intervention medical workup is advised if symptoms continue. Discussed using medical reassurance for anxiety management vs delay, intervene and reasonable evaluation. A:  MSE:    Appearance: good hygiene  and appropriate attire  Attitude: cooperative and friendly  Consciousness: alert  Orientation: oriented to person, place, time, general circumstance  Memory: recent and remote memory intact  Attention/Concentration: intact during session  Psychomotor Activity:normal  Eye Contact: normal  Speech: normal rate and volume, well-articulated  Mood: anxious but improved   Affect: congruent  Perception: within normal limits  Thought Content: within normal limits  Thought Process: logical, coherent and goal-directed  Insight: good  Judgment: intact  Ability to understand instructions: Yes  Ability to respond meaningfully: Yes  Morbid Ideation: no   Suicide Assessment: no suicidal ideation, plan, or intent  Homicidal Ideation: no      A:    Diagnosis:    1.  Social anxiety disorder          Diagnosis Date    Blood pressure elevated without history of HTN 44/14/3470    Eosinophilic esophagitis 1/06/5226    Migraine without aura and without status migrainosus, not intractable 12/11/2017    New daily persistent headache 10/11/2016    Oropharyngeal dysphagia 3/25/2020         Plan:  Pt interventions:  Discussed various factors related to the development and maintenance of  anxiety (including biological, cognitive, behavioral, and environmental factors), Trained in strategies for increasing balanced thinking, Discussed and set plan for behavioral activation, Discussed self-care (sleep, nutrition, rewarding activities, social support, exercise), Supportive techniques, Emphasized self-care as important for managing overall health and Provided Psychoeducation re: avoidance anxiety cycle no

## 2021-10-18 ENCOUNTER — TELEPHONE (OUTPATIENT)
Dept: INTERNAL MEDICINE CLINIC | Age: 36
End: 2021-10-18

## 2021-10-18 NOTE — TELEPHONE ENCOUNTER
----- Message from Stephanie Vann sent at 10/18/2021  8:25 AM EDT -----  Subject: Appointment Request    Reason for Call: Urgent (Patient Request) No Script    QUESTIONS  Type of Appointment? Established Patient  Reason for appointment request? Available appointments did not meet   patient need  Additional Information for Provider? Pt called in regards to having pin   point pain in chest and it moved across body sometimes in stomach , side,   legs. Pt has been dealing with the pain for over a month now  ---------------------------------------------------------------------------  --------------  CALL BACK INFO  What is the best way for the office to contact you? OK to leave message on   voicemail  Preferred Call Back Phone Number? [phone]  ---------------------------------------------------------------------------  --------------  SCRIPT ANSWERS  Relationship to Patient? Self  (Is the patient requesting to see the provider for a procedure?)? No  (Is the patient requesting to see the provider urgently  today or   tomorrow. )? Yes  Have you been diagnosed with, awaiting test results for, or told that you   are suspected of having COVID-19 (Coronavirus)? (If patient has tested   negative or was tested as a requirement for work, school, or travel and   not based on symptoms, answer no)? No  Within the past two weeks have you developed any of the following symptoms   (answer no if symptoms have been present longer than 2 weeks or began   more than 2 weeks ago)? Fever or Chills, Cough, Shortness of breath or   difficulty breathing, Loss of taste or smell, Sore throat, Nasal   congestion, Sneezing or runny nose, Fatigue or generalized body aches   (answer no if pain is specific to a body part e.g. back pain), Diarrhea,   Headache? No  Have you had close contact with someone with COVID-19 in the last 14 days? No  (Service Expert  click yes below to proceed with [employer]ual   Scheduling)?  Yes

## 2021-10-19 ENCOUNTER — HOSPITAL ENCOUNTER (OUTPATIENT)
Dept: CT IMAGING | Age: 36
Discharge: HOME | End: 2021-10-19
Payer: COMMERCIAL

## 2021-10-19 ENCOUNTER — OFFICE VISIT (OUTPATIENT)
Dept: INTERNAL MEDICINE CLINIC | Age: 36
End: 2021-10-19
Payer: COMMERCIAL

## 2021-10-19 VITALS
DIASTOLIC BLOOD PRESSURE: 70 MMHG | SYSTOLIC BLOOD PRESSURE: 148 MMHG | HEIGHT: 70 IN | WEIGHT: 144 LBS | OXYGEN SATURATION: 98 % | HEART RATE: 91 BPM | BODY MASS INDEX: 20.62 KG/M2

## 2021-10-19 DIAGNOSIS — R07.9 CHEST PAIN, UNSPECIFIED TYPE: Primary | ICD-10-CM

## 2021-10-19 DIAGNOSIS — Z00.00 PREVENTATIVE HEALTH CARE: ICD-10-CM

## 2021-10-19 DIAGNOSIS — R07.9 CHEST PAIN, UNSPECIFIED TYPE: ICD-10-CM

## 2021-10-19 PROCEDURE — 90471 IMMUNIZATION ADMIN: CPT | Performed by: HOSPITALIST

## 2021-10-19 PROCEDURE — 6360000004 HC RX CONTRAST MEDICATION: Performed by: INTERNAL MEDICINE

## 2021-10-19 PROCEDURE — 93000 ELECTROCARDIOGRAM COMPLETE: CPT | Performed by: HOSPITALIST

## 2021-10-19 PROCEDURE — 71260 CT THORAX DX C+: CPT

## 2021-10-19 PROCEDURE — 90674 CCIIV4 VAC NO PRSV 0.5 ML IM: CPT | Performed by: HOSPITALIST

## 2021-10-19 PROCEDURE — 99214 OFFICE O/P EST MOD 30 MIN: CPT | Performed by: HOSPITALIST

## 2021-10-19 RX ORDER — LORAZEPAM 0.5 MG/1
0.5 TABLET ORAL EVERY 8 HOURS PRN
Qty: 20 TABLET | Refills: 0 | Status: SHIPPED | OUTPATIENT
Start: 2021-10-19 | End: 2021-11-03

## 2021-10-19 RX ADMIN — IOPAMIDOL 80 ML: 755 INJECTION, SOLUTION INTRAVENOUS at 13:07

## 2021-10-19 SDOH — ECONOMIC STABILITY: FOOD INSECURITY: WITHIN THE PAST 12 MONTHS, YOU WORRIED THAT YOUR FOOD WOULD RUN OUT BEFORE YOU GOT MONEY TO BUY MORE.: NEVER TRUE

## 2021-10-19 SDOH — ECONOMIC STABILITY: FOOD INSECURITY: WITHIN THE PAST 12 MONTHS, THE FOOD YOU BOUGHT JUST DIDN'T LAST AND YOU DIDN'T HAVE MONEY TO GET MORE.: NEVER TRUE

## 2021-10-19 ASSESSMENT — ENCOUNTER SYMPTOMS
BLOOD IN STOOL: 0
SHORTNESS OF BREATH: 0
WHEEZING: 0
BACK PAIN: 0
TROUBLE SWALLOWING: 0
VOICE CHANGE: 0
SINUS PRESSURE: 0
SINUS PAIN: 0
COUGH: 0
SORE THROAT: 0
ABDOMINAL DISTENTION: 0
CHEST TIGHTNESS: 1
DIARRHEA: 0
NAUSEA: 0
ABDOMINAL PAIN: 1
VOMITING: 0
CONSTIPATION: 0

## 2021-10-19 ASSESSMENT — SOCIAL DETERMINANTS OF HEALTH (SDOH): HOW HARD IS IT FOR YOU TO PAY FOR THE VERY BASICS LIKE FOOD, HOUSING, MEDICAL CARE, AND HEATING?: NOT HARD AT ALL

## 2021-10-19 NOTE — PROGRESS NOTES
[employer] [facility] Internal Medicine  Follow-up care visit   10/19/2021    Patient:  Hi Lopez                                               : 1985  Age: 29 y.o. MRN: <[id number]>  Date : 10/19/2021    Hi Lopez is a 29 y.o. male with a past medical hx of eosinophilic esophagitis who presents for :  Stress/Chest pain. The patient had the recent birth of a premature niece (doing well). He is having sensations in different parts of the chest and abdomen. They are low intensity and constant throughout the day but extremely migratory changing location frequently (moving at least three times a day). The sensations are worsened by feelings of anxiety. Both his anxiety and physical sensations are relieved with lorazepam and consumption of beer (2-3 on weekends). No radiation into the jaw, teeth, neck. No diaphoresis, nausea or vomiting. Occasional palpitation. Chief Complaint   Patient presents with    Chest Pain       Review of Systems   Constitutional: Negative for activity change, appetite change, chills, diaphoresis, fatigue, fever and unexpected weight change. HENT: Negative for sinus pressure, sinus pain, sore throat, trouble swallowing and voice change. Eyes: Negative for visual disturbance. Respiratory: Positive for chest tightness. Negative for cough, shortness of breath and wheezing. Cardiovascular: Positive for chest pain (Nonradiating but migratory. ) and palpitations. Negative for leg swelling. Gastrointestinal: Positive for abdominal pain. Negative for abdominal distention, blood in stool, constipation, diarrhea, nausea and vomiting. Endocrine: Negative for polydipsia and polyphagia. Genitourinary: Negative for decreased urine volume, difficulty urinating, dysuria and urgency. Musculoskeletal: Negative for back pain, gait problem, joint swelling and myalgias. Neurological: Negative for dizziness, seizures, syncope, light-headedness and headaches. Psychiatric/Behavioral: Negative for agitation, behavioral problems, confusion, sleep disturbance and suicidal ideas. The patient is nervous/anxious. Past Medical History:   Diagnosis Date    Blood pressure elevated without history of HTN 45/80/3939    Eosinophilic esophagitis 5/22/6702    Migraine without aura and without status migrainosus, not intractable 12/11/2017    New daily persistent headache 10/11/2016    Oropharyngeal dysphagia 3/25/2020       Past Surgical History:   Procedure Laterality Date    HERNIA REPAIR      UPPER GASTROINTESTINAL ENDOSCOPY N/A 3/24/2020    EGD DILATION BALLOON performed by Bing Sommer MD at [address] 3/24/2020    EGD BIOPSY performed by Bing Sommer MD at [facility] ENDOSCOPY       Current Outpatient Medications   Medication Sig Dispense Refill    LORazepam (ATIVAN) 0.5 MG tablet Take 1 tablet by mouth every 8 hours as needed for Anxiety for up to 15 days. 20 tablet 0    omeprazole (PRILOSEC) 40 MG delayed release capsule Take 40 mg by mouth daily       No current facility-administered medications for this visit. BP (!) 148/70   Pulse 91   Ht 5' 10\" (1.778 m)   Wt 144 lb (65.3 kg)   SpO2 98%   BMI 20.66 kg/m²     Physical Exam   Physical Exam  Vitals reviewed. Constitutional:       General: He is not in acute distress. Appearance: Normal appearance. HENT:      Head: Normocephalic and atraumatic. Mouth/Throat:      Pharynx: Oropharynx is clear. Eyes:      Conjunctiva/sclera: Conjunctivae normal.      Pupils: Pupils are equal, round, and reactive to light. Cardiovascular:      Rate and Rhythm: Normal rate and regular rhythm. Pulses: Normal pulses. Heart sounds: Normal heart sounds. Pulmonary:      Effort: Pulmonary effort is normal. No respiratory distress. Breath sounds: Normal breath sounds. No wheezing or rales. Abdominal:      Palpations: Abdomen is soft. Tenderness: There is no abdominal tenderness. There is no rebound. Musculoskeletal:         General: No signs of injury. Normal range of motion. Cervical back: Normal range of motion and neck supple. Skin:     General: Skin is warm and dry. Coloration: Skin is not jaundiced. Neurological:      General: No focal deficit present. Mental Status: He is alert and oriented to person, place, and time. Psychiatric:         Behavior: Behavior normal.         Thought Content: Thought content normal.         Judgment: Judgment normal.         Assessment/ plan:     1. Chest pain, unspecified type  -PRN Lorazepam refilled. F/U Dr. Angel Avila.    - EKG 12 Lead reviewed. - CT CHEST PULMONARY EMBOLISM W CONTRAST; Future    2.  Preventative health care  - INFLUENZA, MDCK QUADV, 2 YRS AND OLDER, IM, PF, PREFILL SYR OR SDV, 0.5ML (FLUCELVAX QUADV, PF)       Return in about 1 month (around 11/19/2021). (Dr. Mery Betancourt)    Estelita Chowdhury MD

## 2021-10-22 ENCOUNTER — VIRTUAL VISIT (OUTPATIENT)
Dept: PSYCHOLOGY | Age: 36
End: 2021-10-22
Payer: COMMERCIAL

## 2021-10-22 DIAGNOSIS — F40.10 SOCIAL ANXIETY DISORDER: Primary | ICD-10-CM

## 2021-10-22 PROCEDURE — 90832 PSYTX W PT 30 MINUTES: CPT | Performed by: PSYCHOLOGIST

## 2021-10-22 NOTE — PROGRESS NOTES
Behavioral Health Consultation  Pricila Vasquez Psy.D. Clinical Psychologist/ Behavioral Health Consultant  10/22/2021  11:46 AM    Time spent with Patient: 30 minutes  This is patient's 22nd [facility] appointment. Reason for Consult:    Chief Complaint   Patient presents with    Anxiety     Referring Provider: No referring provider defined for this encounter. Feedback given to PCP. TELEHEALTH VISIT -- Audio/Visual (During ODOBD-57 public health emergency)  }  Pursuant to the emergency declaration under the [facility] waiver authority and the [employer] Act, this Virtual Visit was conducted, with patient's consent, to reduce the patient's risk of exposure to COVID-19 and provide continuity of care for an established patient. Services were provided through a video synchronous discussion virtually to substitute for in-person clinic visit. Pt gave verbal informed consent to participate in telehealth services. Conducted a risk-benefit analysis and determined that the patient's presenting problems are consistent with the use of telepsychology. Determined that the patient has sufficient knowledge and skills in the use of technology enabling them to adequately benefit from telepsychology. It was determined that this patient was able to be properly treated without an in-person session. Patient verified that they were currently located at the [facility] address that was provided during registration.     Verified the following information:  Patient's identification: Yes  Patient location: [address] [place] [address] [address]   Patient's call back number: [phone]   Patient's emergency contact's name and number, as well as permission to contact them if needed: Extended Emergency Contact Information  Primary Emergency Contact: [place] [address] Phone: [phone]  Relation: Parent  Secondary Emergency Contact: [address] Phone: [phone]  Mobile Phone: [phone]  Relation: Parent     Provider location: Ronnell Robertson:   The pt had medical workup with no significant findings  The pt has been more frustrated with continued sensation     The pt has been waking an hour earlier, laying in bed and focusing on sensation    No nightmares or vivid dreams     O:  The pt continues to have physical sensation that seems to be related to ongoing anxious reaction from when niece was born prematurely. The pt has been effectively using expansion and acceptance techniques. The pt has reverted to focusing on sensations presence in the morning, \"expecting it to come. \" Discussed trying to use exercise and paradoxical intervention. In addition, discussed self validation     A:  MSE:    Appearance: good hygiene  and appropriate attire  Attitude: cooperative and friendly  Consciousness: alert  Orientation: oriented to person, place, time, general circumstance  Memory: recent and remote memory intact  Attention/Concentration: intact during session  Psychomotor Activity:normal  Eye Contact: normal  Speech: normal rate and volume, well-articulated  Mood: down   Affect: congruent  Perception: within normal limits  Thought Content: within normal limits  Thought Process: logical, coherent and goal-directed  Insight: good  Judgment: intact  Ability to understand instructions: Yes  Ability to respond meaningfully: Yes  Morbid Ideation: no   Suicide Assessment: no suicidal ideation, plan, or intent  Homicidal Ideation: no      A:    Diagnosis:    1.  Social anxiety disorder          Diagnosis Date    Blood pressure elevated without history of HTN 66/76/3864    Eosinophilic esophagitis 6/17/6523    Migraine without aura and without status migrainosus, not intractable 12/11/2017    New daily persistent headache 10/11/2016    Oropharyngeal dysphagia 3/25/2020         Plan:  Pt interventions:  Trained in strategies for increasing balanced thinking, Discussed and set plan for behavioral activation, Trained in relaxation strategies, Conducted functional assessment, [place]-setting to identify pt's primary goals for [facility] visit / overall health, Supportive techniques and Emphasized self-care as important for managing overall health

## 2021-10-29 ENCOUNTER — VIRTUAL VISIT (OUTPATIENT)
Dept: PSYCHOLOGY | Age: 36
End: 2021-10-29
Payer: COMMERCIAL

## 2021-10-29 DIAGNOSIS — F40.10 SOCIAL ANXIETY DISORDER: Primary | ICD-10-CM

## 2021-10-29 PROCEDURE — 90832 PSYTX W PT 30 MINUTES: CPT | Performed by: PSYCHOLOGIST

## 2021-10-29 NOTE — PROGRESS NOTES
Behavioral Health Consultation  Jaret Peterson Psy.D. Clinical Psychologist/ Behavioral Health Consultant  10/29/2021  9:34 AM    Time spent with Patient: 20 minutes  This is patient's 23rd [facility] appointment. Reason for Consult:    Chief Complaint   Patient presents with    Anxiety     Referring Provider: No referring provider defined for this encounter. Feedback given to PCP. TELEHEALTH VISIT -- Audio/Visual (During LSUWI-57 public health emergency)  }  Pursuant to the emergency declaration under the [facility] [place] [address] waiver authority and the [employer] Act, this Virtual Visit was conducted, with patient's consent, to reduce the patient's risk of exposure to COVID-19 and provide continuity of care for an established patient. Services were provided through a video synchronous discussion virtually to substitute for in-person clinic visit. Pt gave verbal informed consent to participate in telehealth services. Conducted a risk-benefit analysis and determined that the patient's presenting problems are consistent with the use of telepsychology. Determined that the patient has sufficient knowledge and skills in the use of technology enabling them to adequately benefit from telepsychology. It was determined that this patient was able to be properly treated without an in-person session. Patient verified that they were currently located at the [facility] address that was provided during registration.     Verified the following information:  Patient's identification: Yes  Patient location: [address] [place] [address] [address]   Patient's call back number: [phone]   Patient's emergency contact's name and number, as well as permission to contact them if needed: Extended Emergency Contact Information  Primary Emergency Contact: [place] [address] Phone: [phone]  Relation: Parent  Secondary Emergency ContactAmaury Carpio  Home Phone: [phone]  Mobile Phone: [phone]  Relation: Parent     Provider location: Norbert Yoo:  Things have been going well over this week  Feeling less anxious overall, still feeling some of the tightness/pain in stomach and chest when he pays attention to it   Sleep has been good no early waking, has been able to focus on work   Not avoiding social interacts   Trying to focus on things within his control, successes   Exercise and routine has been helpful     O:  The pt has been doing well overall. Sensation is present when attending to it. However, not interfering with daily functioning, able to focus on work, social interactions, sleeping ok without early morning waking. Discussed transition of care plan over the next month     A:  MSE:    Appearance: good hygiene  and appropriate attire  Attitude: cooperative and friendly  Consciousness: alert  Orientation: oriented to person, place, time, general circumstance  Memory: recent and remote memory intact  Attention/Concentration: intact during session  Psychomotor Activity:normal  Eye Contact: normal  Speech: normal rate and volume, well-articulated  Mood: good   Affect: euthymic  Perception: within normal limits  Thought Content: within normal limits  Thought Process: logical, coherent and goal-directed  Insight: good  Judgment: intact  Ability to understand instructions: Yes  Ability to respond meaningfully: Yes  Morbid Ideation: no   Suicide Assessment: no suicidal ideation, plan, or intent  Homicidal Ideation: no      A:    Diagnosis:    1.  Social anxiety disorder          Diagnosis Date    Blood pressure elevated without history of HTN 69/61/5430    Eosinophilic esophagitis 1/30/1597    Migraine without aura and without status migrainosus, not intractable 12/11/2017    New daily persistent headache 10/11/2016    Oropharyngeal dysphagia 3/25/2020         Plan:  Pt interventions:  Discussed various factors related to the development and maintenance of  anxiety (including biological, cognitive, behavioral, and environmental factors), Trained in strategies for increasing balanced thinking, Discussed and set plan for behavioral activation, Trained in relaxation strategies, Conducted functional assessment, [place]-setting to identify pt's primary goals for [facility] visit / overall health, Supportive techniques and Emphasized self-care as important for managing overall health

## 2021-11-10 ENCOUNTER — VIRTUAL VISIT (OUTPATIENT)
Dept: PSYCHOLOGY | Age: 36
End: 2021-11-10
Payer: COMMERCIAL

## 2021-11-10 ENCOUNTER — TELEPHONE (OUTPATIENT)
Dept: INTERNAL MEDICINE CLINIC | Age: 36
End: 2021-11-10

## 2021-11-10 DIAGNOSIS — F40.10 SOCIAL ANXIETY DISORDER: Primary | ICD-10-CM

## 2021-11-10 PROCEDURE — 90832 PSYTX W PT 30 MINUTES: CPT | Performed by: PSYCHOLOGIST

## 2021-11-10 NOTE — TELEPHONE ENCOUNTER
Call returned to patient. Message left for patient to call back to schedule an appointment. Appointment can be scheduled with first available physician.
click yes below to proceed with [employer] As Usual   Scheduling)?  Yes

## 2021-11-12 ENCOUNTER — OFFICE VISIT (OUTPATIENT)
Dept: INTERNAL MEDICINE CLINIC | Age: 36
End: 2021-11-12
Payer: COMMERCIAL

## 2021-11-12 VITALS
TEMPERATURE: 98.4 F | DIASTOLIC BLOOD PRESSURE: 80 MMHG | BODY MASS INDEX: 20.33 KG/M2 | OXYGEN SATURATION: 99 % | WEIGHT: 142 LBS | HEIGHT: 70 IN | SYSTOLIC BLOOD PRESSURE: 130 MMHG

## 2021-11-12 DIAGNOSIS — R03.0 BLOOD PRESSURE ELEVATED WITHOUT HISTORY OF HTN: ICD-10-CM

## 2021-11-12 DIAGNOSIS — R07.89 OTHER CHEST PAIN: ICD-10-CM

## 2021-11-12 DIAGNOSIS — K20.0 EOSINOPHILIC ESOPHAGITIS: ICD-10-CM

## 2021-11-12 DIAGNOSIS — R10.84 GENERALIZED ABDOMINAL PAIN: ICD-10-CM

## 2021-11-12 DIAGNOSIS — F41.9 ANXIETY: ICD-10-CM

## 2021-11-12 DIAGNOSIS — K21.00 GASTROESOPHAGEAL REFLUX DISEASE WITH ESOPHAGITIS WITHOUT HEMORRHAGE: ICD-10-CM

## 2021-11-12 PROCEDURE — 99214 OFFICE O/P EST MOD 30 MIN: CPT | Performed by: INTERNAL MEDICINE

## 2021-11-12 RX ORDER — SERTRALINE HYDROCHLORIDE 25 MG/1
75 TABLET, FILM COATED ORAL DAILY
Qty: 90 TABLET | Refills: 3 | Status: SHIPPED | OUTPATIENT
Start: 2021-11-12

## 2021-11-12 ASSESSMENT — ENCOUNTER SYMPTOMS
SHORTNESS OF BREATH: 0
CHEST TIGHTNESS: 0
ABDOMINAL PAIN: 0
RESPIRATORY NEGATIVE: 1

## 2021-11-12 NOTE — PATIENT INSTRUCTIONS
Start the zoloft 1/2 tab at bedtime (don't pay attention to the directions on the bottle! ! )  If in 3-4 days you notice NO difference go ahead and increase to the whole tab  Remember rx dosing is up to 200 mg daily  If you feel its helping some, give it another week or 2 then if you feel great stay at 25 mg but if not increase to the 50 then wait another 3-4 weeks and increase if needed to 75 mg ( 3 tabs)   Average dose is  mg

## 2021-11-12 NOTE — PROGRESS NOTES
Subjective:      Patient ID: Ivett Ron is a 29 y.o. male. Chief Complaint   Patient presents with    Abdominal Pain     recurring  discomfort upper body ? ?? x 1 mo. poss. bill , seeing therapist,      Earlier this year had onset of anxiety associated w work- seen by Ashley Munoz- started having panic attacks and feels he has never been better since that- stressed after birth of 2nd niece who had to be in the NICU- has had chest pain related to the episodes of anxiety and has had a complete evaluation all negative- area of pain radiates around the chest-some days are better than other but days of increased stress at work causes some increased pain- migraines are better recently- bp well controlled on no meds- chest pain gets worse as the day goes on -does have some GERD w his eosinophilic esophagitis and on daily omeprazole    Review of Systems   Constitutional: Negative for appetite change and fatigue. HENT: Negative. Respiratory: Negative. Negative for chest tightness and shortness of breath. Cardiovascular: Positive for chest pain. Negative for palpitations. Gastrointestinal: Negative for abdominal pain. Genitourinary: Negative. Musculoskeletal: Positive for myalgias. Skin: Negative. Neurological: Negative for weakness. Psychiatric/Behavioral: Negative for dysphoric mood. The patient is nervous/anxious.         Family History   Problem Relation Age of Onset    Depression Mother     Cancer Paternal Uncle     Cancer Maternal Grandmother     Cancer Maternal Grandfather     Cancer Paternal Grandmother     Cancer Paternal Grandfather     Diabetes Other      Social History     Socioeconomic History    Marital status: Single     Spouse name: Not on file    Number of children: Not on file    Years of education: Not on file    Highest education level: Not on file   Occupational History    Not on file   Tobacco Use    Smoking status: Never Smoker    Smokeless tobacco: Never Used   Vaping Use    Vaping Use: Never used   Substance and Sexual Activity    Alcohol use: Yes     Alcohol/week: 5.0 standard drinks     Types: 5 Cans of beer per week     Comment: 2 drinks daily     Drug use: No    Sexual activity: Not on file   Other Topics Concern    Not on file   Social History Narrative    Not on file     Social Determinants of Health     Financial Resource Strain: Low Risk     Difficulty of Paying Living Expenses: Not hard at all   Food Insecurity: No Food Insecurity    Worried About [facility] in the Last Year: Never true    [facility] in the Last Year: Never true   Transportation Needs:     Lack of Transportation (Medical): Not on file    Lack of Transportation (Non-Medical): Not on file   Physical Activity:     Days of Exercise per Week: Not on file    Minutes of Exercise per Session: Not on file   Stress:     Feeling of Stress : Not on file   Social Connections:     Frequency of Communication with Friends and Family: Not on file    Frequency of Social Gatherings with Friends and Family: Not on file    Attends Christianity Services: Not on file    Active Member of [address] [place] [address] or Organizations: Not on file    Attends Club or Organization Meetings: Not on file    Marital Status: Not on file   Intimate Partner Violence:     Fear of Current or Ex-Partner: Not on file    Emotionally Abused: Not on file    Physically Abused: Not on file    Sexually Abused: Not on file   Housing Stability:     Unable to Pay for Housing in the Last Year: Not on file    Number of [place] in the Last Year: Not on file    Unstable Housing in the Last Year: Not on file         Objective:   Physical Exam  Constitutional:       Appearance: He is well-developed. HENT:      Head: Atraumatic. Mouth/Throat:      Pharynx: No oropharyngeal exudate. Eyes:      Conjunctiva/sclera: Conjunctivae normal.      Pupils: Pupils are equal, round, and reactive to light.    Neck:      Thyroid: No thyromegaly. Cardiovascular:      Rate and Rhythm: Normal rate and regular rhythm. Heart sounds: Normal heart sounds. No murmur heard. Pulmonary:      Effort: Pulmonary effort is normal. No respiratory distress. Breath sounds: Normal breath sounds. Abdominal:      General: There is no distension. Tenderness: There is no abdominal tenderness. Musculoskeletal:      Cervical back: Normal range of motion and neck supple. Lymphadenopathy:      Cervical: No cervical adenopathy. Skin:     General: Skin is warm and dry. Neurological:      Mental Status: He is alert and oriented to person, place, and time. Cranial Nerves: No cranial nerve deficit. Deep Tendon Reflexes: Reflexes are normal and symmetric. Psychiatric:         Behavior: Behavior normal.         Thought Content: Thought content normal.         Judgment: Judgment normal.           Assessment and Plan:      Blood pressure elevated without history of HTN   Situational only-cont to monitor    Eosinophilic esophagitis   Cont f/u w GI-for reevaluation soon -cont ppi    Abdominal pain   ? ? All related to IBS- will have pt f/u w gi but trial of zoloft for anxiety    Anxiety   Trial of zoloft- cont f/u w therapy     Other chest pain   ? ? All costochondritis- hopefully zoloft will help- cautious use of nsaids w esophagitis history     Gastroesophageal reflux disease with esophagitis without hemorrhage   Possibly contributing to his chest pain? Cont ppi       Encounter Diagnoses   Name Primary?  Blood pressure elevated without history of HTN     Eosinophilic esophagitis     Generalized abdominal pain     Anxiety     Other chest pain     Gastroesophageal reflux disease with esophagitis without hemorrhage        No orders of the defined types were placed in this encounter.

## 2021-11-15 PROBLEM — K21.00 GASTROESOPHAGEAL REFLUX DISEASE WITH ESOPHAGITIS WITHOUT HEMORRHAGE: Status: ACTIVE | Noted: 2021-11-15

## 2021-11-15 PROBLEM — R07.89 OTHER CHEST PAIN: Status: ACTIVE | Noted: 2021-11-15

## 2021-11-15 PROBLEM — R10.9 ABDOMINAL PAIN: Status: ACTIVE | Noted: 2021-11-15

## 2021-11-15 PROBLEM — R07.81 PLEURODYNIA: Status: ACTIVE | Noted: 2021-11-15

## 2021-11-15 PROBLEM — F41.9 ANXIETY: Status: ACTIVE | Noted: 2021-11-15

## 2021-11-30 ENCOUNTER — TELEPHONE (OUTPATIENT)
Dept: FAMILY MEDICINE CLINIC | Age: 36
End: 2021-11-30

## 2021-11-30 ENCOUNTER — VIRTUAL VISIT (OUTPATIENT)
Dept: PSYCHOLOGY | Age: 36
End: 2021-11-30
Payer: COMMERCIAL

## 2021-11-30 DIAGNOSIS — F40.10 SOCIAL ANXIETY DISORDER: Primary | ICD-10-CM

## 2021-11-30 PROCEDURE — 90832 PSYTX W PT 30 MINUTES: CPT | Performed by: PSYCHOLOGIST

## 2021-11-30 NOTE — PROGRESS NOTES
Behavioral Health Consultation  Jana Youssef Psy.D. Clinical Psychologist/ Behavioral Health Consultant  11/30/2021  10:58 AM    Time spent with Patient: 20 minutes  This is patient's 25th [facility] appointment. Reason for Consult:    Chief Complaint   Patient presents with    Anxiety     Referring Provider: No referring provider defined for this encounter. Feedback given to PCP. TELEHEALTH VISIT -- Audio/Visual (During BRQQR-20 public health emergency)  }  Pursuant to the emergency declaration under the [address] [place] [address] waiver authority and the [employer] Act, this Virtual Visit was conducted, with patient's consent, to reduce the patient's risk of exposure to COVID-19 and provide continuity of care for an established patient. Services were provided through a video synchronous discussion virtually to substitute for in-person clinic visit. Pt gave verbal informed consent to participate in telehealth services. Conducted a risk-benefit analysis and determined that the patient's presenting problems are consistent with the use of telepsychology. Determined that the patient has sufficient knowledge and skills in the use of technology enabling them to adequately benefit from telepsychology. It was determined that this patient was able to be properly treated without an in-person session. Patient verified that they were currently located at the [facility] address that was provided during registration.     Verified the following information:  Patient's identification: Yes  Patient location: [address] [place] [address] [address]   Patient's call back number: [phone]   Patient's emergency contact's name and number, as well as permission to contact them if needed: Extended Emergency Contact Information  Primary Emergency Contact: [place] [address] Phone: [phone]  Relation: Parent  Secondary Emergency Contact: [address] [facility] Phone: [phone]  Mobile Phone: [phone]  Relation: Parent     Provider location: Yousif Marie:  Was rxed zoloft by PCP but chose not to start taking it  Over Thanksgiving vacation had days that he felt better   Wants to wait it out and see how he feels   Physical sensation has not persisted, comes up every once in a while   Sleep has been good- falling and staying asleep, when wakes early is able to fall back asleep   Eating is WNL  Has not had panic and worry about being in public   End of the month meetings have been going really well, taking time, more routined     O:  The pt is doing well overall. Physiological sx of anxiety has reduced in freq, intensity and severity. Sleep is improving. Overall anxiety is down. Discussed learning to pay more attention to messages from his body. Learning how to balance engaging activities with calming activities and how expectations increase anxiety. A:  MSE:    Appearance: good hygiene  and appropriate attire  Attitude: cooperative and friendly  Consciousness: alert  Orientation: oriented to person, place, time, general circumstance  Memory: recent and remote memory intact  Attention/Concentration: intact during session  Psychomotor Activity:normal  Eye Contact: normal  Speech: normal rate and volume, well-articulated  Mood: good   Affect: euthymic  Perception: within normal limits  Thought Content: within normal limits  Thought Process: logical, coherent and goal-directed  Insight: good  Judgment: intact  Ability to understand instructions: Yes  Ability to respond meaningfully: Yes  Morbid Ideation: no   Suicide Assessment: no suicidal ideation, plan, or intent  Homicidal Ideation: no      A:    Diagnosis:    1.  Social anxiety disorder          Diagnosis Date    Anxiety 11/15/2021    Blood pressure elevated without history of HTN 64/35/4850    Eosinophilic esophagitis 8/14/1282    Migraine without aura and without status migrainosus, not intractable 12/11/2017    New daily persistent headache 10/11/2016    Oropharyngeal dysphagia 3/25/2020       Plan:  Pt interventions:  Discussed various factors related to the development and maintenance of  anxiety (including biological, cognitive, behavioral, and environmental factors), Trained in strategies for increasing balanced thinking, Discussed and set plan for behavioral activation, Trained in relaxation strategies, Discussed potential barriers to change, Conducted functional assessment, [place]-setting to identify pt's primary goals for [facility] visit / overall health, Supportive techniques and Emphasized self-care as important for managing overall health

## 2021-12-14 ENCOUNTER — VIRTUAL VISIT (OUTPATIENT)
Dept: PSYCHOLOGY | Age: 36
End: 2021-12-14
Payer: COMMERCIAL

## 2021-12-14 ENCOUNTER — TELEPHONE (OUTPATIENT)
Dept: FAMILY MEDICINE CLINIC | Age: 36
End: 2021-12-14

## 2021-12-14 DIAGNOSIS — F40.10 SOCIAL ANXIETY DISORDER: Primary | ICD-10-CM

## 2021-12-14 PROCEDURE — 90832 PSYTX W PT 30 MINUTES: CPT | Performed by: PSYCHOLOGIST

## 2021-12-14 NOTE — PROGRESS NOTES
Behavioral Health Consultation  Donny Lima Psy.D. Clinical Psychologist/ Behavioral Health Consultant  12/14/2021  11:05 AM    Time spent with Patient: 27 minutes  This is patient's 26th [facility] appointment. Reason for Consult:    Chief Complaint   Patient presents with    Anxiety     Referring Provider: No referring provider defined for this encounter. Feedback given to PCP. TELEHEALTH VISIT -- Audio/Visual (During VJHDG-90 public health emergency)  }  Pursuant to the emergency declaration under the [facility], [facility] waiver authority and the [employer] Act, this Virtual Visit was conducted, with patient's consent, to reduce the patient's risk of exposure to COVID-19 and provide continuity of care for an established patient. Services were provided through a video synchronous discussion virtually to substitute for in-person clinic visit. Pt gave verbal informed consent to participate in telehealth services. Conducted a risk-benefit analysis and determined that the patient's presenting problems are consistent with the use of telepsychology. Determined that the patient has sufficient knowledge and skills in the use of technology enabling them to adequately benefit from telepsychology. It was determined that this patient was able to be properly treated without an in-person session. Patient verified that they were currently located at the [facility] address that was provided during registration.     Verified the following information:  Patient's identification: Yes  Patient location: [address] [place] [address] [address]   Patient's call back number: [phone]   Patient's emergency contact's name and number, as well as permission to contact them if needed: Extended Emergency Contact Information  Primary Emergency Contact: [place] [address] Phone: [phone]  Relation: Parent  Secondary Emergency ContactOrestes Valdivia  Home Phone: [phone]  Mobile Phone: [phone]  Relation: Parent     Provider location: Erin Roth:  Overall everything has improved   Still has chest/stomach pains, lower in frequency and intensity   When he focuses on it and expects it, more significant and intense   Looking forward to holidays and increased social interactions, family and friends   Sleep is good, no early waking anymore  No other interference to work or exercise   Feeling completely comfortable in meetings, no anxiety coming up at all, has gotten good feedback     O:  The pt continues to improve mood and anxiety response. Sleep is improved. Recognizing psychological triggers for physical sensation. Continues to focus on self care. Discussed Referral options for continuing care after this provider leaves [facility]. The pt intends to follow up. Continued to reinforce CBT-ACT skills and discussed relapse prevention    A:  MSE:    Appearance: good hygiene  and appropriate attire  Attitude: cooperative and friendly  Consciousness: alert  Orientation: oriented to person, place, time, general circumstance  Memory: recent and remote memory intact  Attention/Concentration: intact during session  Psychomotor Activity:normal  Eye Contact: normal  Speech: normal rate and volume, well-articulated  Mood: good   Affect: euthymic  Perception: within normal limits  Thought Content: within normal limits  Thought Process: logical, coherent and goal-directed  Insight: good  Judgment: intact  Ability to understand instructions: Yes  Ability to respond meaningfully: Yes  Morbid Ideation: no   Suicide Assessment: no suicidal ideation, plan, or intent  Homicidal Ideation: no      A:    Diagnosis:    1.  Social anxiety disorder          Diagnosis Date    Anxiety 11/15/2021    Blood pressure elevated without history of HTN 81/32/0242    Eosinophilic esophagitis 6/01/8530    Migraine without aura and without status migrainosus, not intractable 12/11/2017    New daily persistent headache 10/11/2016    Oropharyngeal dysphagia 3/25/2020         Plan:  Pt interventions:  Trained in strategies for increasing balanced thinking, Discussed and set plan for behavioral activation, Trained in relaxation strategies, Conducted functional assessment, [place]-setting to identify pt's primary goals for [facility] visit / overall health, Supportive techniques, Emphasized self-care as important for managing overall health, CBT to target anxiety and Reviewed options for identifying appropriate providers

## 2022-05-11 ENCOUNTER — TELEPHONE (OUTPATIENT)
Dept: INTERNAL MEDICINE CLINIC | Age: 37
End: 2022-05-11

## 2022-05-11 DIAGNOSIS — Z00.00 PE (PHYSICAL EXAM), ANNUAL: Primary | ICD-10-CM

## 2022-05-11 LAB
CHOLESTEROL, TOTAL: 194 MG/DL (ref 0–199)
GLUCOSE BLD-MCNC: 98 MG/DL (ref 70–99)
HDLC SERPL-MCNC: 46 MG/DL (ref 40–60)
LDL CHOLESTEROL CALCULATED: 134 MG/DL
TRIGL SERPL-MCNC: 70 MG/DL (ref 0–150)

## 2022-05-11 NOTE — TELEPHONE ENCOUNTER
----- Message from Bethany Faulkner sent at 5/11/2022  2:28 PM EDT -----  Subject: Appointment Request    Reason for Call: Routine Physical Exam    QUESTIONS  Type of Appointment? Established Patient  Reason for appointment request? No appointments available during search  Additional Information for Provider? Adult Physical  Blood Work few wks   prior  Screened [place] Preferably  Sept 1st or after   ---------------------------------------------------------------------------  --------------  CALL BACK INFO  What is the best way for the office to contact you? OK to leave message on   voicemail  Preferred Call Back Phone Number? [phone]  ---------------------------------------------------------------------------  --------------  SCRIPT ANSWERS  Relationship to Patient? Self  (If the patient has Medicare as their primary insurance coverage ask this   question) Are you requesting a Medicare Annual Wellness Visit? No  (Is the patient requesting a pap smear with their physical exam?)? No  (Is the patient requesting their annual physical and does not need PAP or   AWV per above?)? Yes   Have you been diagnosed with, awaiting test results for, or told that you   are suspected of having COVID-19 (Coronavirus)? (If patient has tested   negative or was tested as a requirement for work, school, or travel and   not based on symptoms, answer no)? No  Within the past 10 days have you developed any of the following symptoms   (answer no if symptoms have been present longer than 10 days or began   more than 10 days ago)? Fever or Chills, Cough, Shortness of breath or   difficulty breathing, Loss of taste or smell, Sore throat, Nasal   congestion, Sneezing or runny nose, Fatigue or generalized body aches   (answer no if pain is specific to a body part e.g. back pain), Diarrhea,   Headache? No  Have you had close contact with someone with COVID-19 in the last 7 days?    No  (Service Expert  click yes below to proceed with Sagrario Rasmussen As Usual   Scheduling)?  Yes

## 2022-05-11 NOTE — TELEPHONE ENCOUNTER
Patient scheduled for physical on 9/15/22. Patient would like orders placed for blood work and would like a urinalysis included in that.      Pls advise

## 2022-11-17 DIAGNOSIS — Z00.00 PE (PHYSICAL EXAM), ANNUAL: ICD-10-CM

## 2022-11-17 LAB
A/G RATIO: 2 (ref 1.1–2.2)
ALBUMIN SERPL-MCNC: 4.5 G/DL (ref 3.4–5)
ALP BLD-CCNC: 68 U/L (ref 40–129)
ALT SERPL-CCNC: 17 U/L (ref 10–40)
ANION GAP SERPL CALCULATED.3IONS-SCNC: 16 MMOL/L (ref 3–16)
AST SERPL-CCNC: 17 U/L (ref 15–37)
BASOPHILS ABSOLUTE: 0 K/UL (ref 0–0.2)
BASOPHILS RELATIVE PERCENT: 0.3 %
BILIRUB SERPL-MCNC: 0.6 MG/DL (ref 0–1)
BILIRUBIN URINE: NEGATIVE
BLOOD, URINE: NEGATIVE
BUN BLDV-MCNC: 11 MG/DL (ref 7–20)
CALCIUM SERPL-MCNC: 9.5 MG/DL (ref 8.3–10.6)
CHLORIDE BLD-SCNC: 92 MMOL/L (ref 99–110)
CHOLESTEROL, TOTAL: 161 MG/DL (ref 0–199)
CLARITY: CLEAR
CO2: 24 MMOL/L (ref 21–32)
COLOR: YELLOW
CREAT SERPL-MCNC: 0.7 MG/DL (ref 0.9–1.3)
EOSINOPHILS ABSOLUTE: 0.1 K/UL (ref 0–0.6)
EOSINOPHILS RELATIVE PERCENT: 2.1 %
GFR SERPL CREATININE-BSD FRML MDRD: >60 ML/MIN/{1.73_M2}
GLUCOSE BLD-MCNC: 87 MG/DL (ref 70–99)
GLUCOSE URINE: NEGATIVE MG/DL
HCT VFR BLD CALC: 43.4 % (ref 40.5–52.5)
HDLC SERPL-MCNC: 42 MG/DL (ref 40–60)
HEMOGLOBIN: 14.8 G/DL (ref 13.5–17.5)
KETONES, URINE: NEGATIVE MG/DL
LDL CHOLESTEROL CALCULATED: 103 MG/DL
LEUKOCYTE ESTERASE, URINE: NEGATIVE
LYMPHOCYTES ABSOLUTE: 1.9 K/UL (ref 1–5.1)
LYMPHOCYTES RELATIVE PERCENT: 39.1 %
MCH RBC QN AUTO: 30 PG (ref 26–34)
MCHC RBC AUTO-ENTMCNC: 34 G/DL (ref 31–36)
MCV RBC AUTO: 88.1 FL (ref 80–100)
MICROSCOPIC EXAMINATION: NORMAL
MONOCYTES ABSOLUTE: 0.5 K/UL (ref 0–1.3)
MONOCYTES RELATIVE PERCENT: 10.3 %
NEUTROPHILS ABSOLUTE: 2.3 K/UL (ref 1.7–7.7)
NEUTROPHILS RELATIVE PERCENT: 48.2 %
NITRITE, URINE: NEGATIVE
PDW BLD-RTO: 13.1 % (ref 12.4–15.4)
PH UA: 7 (ref 5–8)
PLATELET # BLD: 227 K/UL (ref 135–450)
PMV BLD AUTO: 8.7 FL (ref 5–10.5)
POTASSIUM SERPL-SCNC: 3.9 MMOL/L (ref 3.5–5.1)
PROTEIN UA: NEGATIVE MG/DL
RBC # BLD: 4.93 M/UL (ref 4.2–5.9)
SODIUM BLD-SCNC: 132 MMOL/L (ref 136–145)
SPECIFIC GRAVITY UA: 1.01 (ref 1–1.03)
TOTAL PROTEIN: 6.7 G/DL (ref 6.4–8.2)
TRIGL SERPL-MCNC: 82 MG/DL (ref 0–150)
TSH SERPL DL<=0.05 MIU/L-ACNC: 2.47 UIU/ML (ref 0.27–4.2)
URINE TYPE: NORMAL
UROBILINOGEN, URINE: 0.2 E.U./DL
VLDLC SERPL CALC-MCNC: 16 MG/DL
WBC # BLD: 4.8 K/UL (ref 4–11)

## 2022-11-18 ENCOUNTER — TELEPHONE (OUTPATIENT)
Dept: INTERNAL MEDICINE CLINIC | Age: 37
End: 2022-11-18

## 2022-11-18 NOTE — TELEPHONE ENCOUNTER
Message  Received: Saint Reil, MD  [facility] Support  Sodium low needs appt       Is 11/30/22 soon enough?

## 2022-11-30 ENCOUNTER — OFFICE VISIT (OUTPATIENT)
Dept: INTERNAL MEDICINE CLINIC | Age: 37
End: 2022-11-30
Payer: COMMERCIAL

## 2022-11-30 VITALS
WEIGHT: 147 LBS | HEIGHT: 70 IN | DIASTOLIC BLOOD PRESSURE: 70 MMHG | BODY MASS INDEX: 21.05 KG/M2 | SYSTOLIC BLOOD PRESSURE: 136 MMHG

## 2022-11-30 DIAGNOSIS — K20.0 EOSINOPHILIC ESOPHAGITIS: Primary | ICD-10-CM

## 2022-11-30 DIAGNOSIS — G43.009 MIGRAINE WITHOUT AURA AND WITHOUT STATUS MIGRAINOSUS, NOT INTRACTABLE: ICD-10-CM

## 2022-11-30 DIAGNOSIS — R03.0 BLOOD PRESSURE ELEVATED WITHOUT HISTORY OF HTN: ICD-10-CM

## 2022-11-30 PROBLEM — R07.89 OTHER CHEST PAIN: Status: RESOLVED | Noted: 2021-11-15 | Resolved: 2022-11-30

## 2022-11-30 PROCEDURE — 99395 PREV VISIT EST AGE 18-39: CPT | Performed by: INTERNAL MEDICINE

## 2022-11-30 SDOH — ECONOMIC STABILITY: FOOD INSECURITY: WITHIN THE PAST 12 MONTHS, THE FOOD YOU BOUGHT JUST DIDN'T LAST AND YOU DIDN'T HAVE MONEY TO GET MORE.: NEVER TRUE

## 2022-11-30 SDOH — ECONOMIC STABILITY: FOOD INSECURITY: WITHIN THE PAST 12 MONTHS, YOU WORRIED THAT YOUR FOOD WOULD RUN OUT BEFORE YOU GOT MONEY TO BUY MORE.: NEVER TRUE

## 2022-11-30 ASSESSMENT — PATIENT HEALTH QUESTIONNAIRE - PHQ9
SUM OF ALL RESPONSES TO PHQ QUESTIONS 1-9: 0
SUM OF ALL RESPONSES TO PHQ QUESTIONS 1-9: 0
2. FEELING DOWN, DEPRESSED OR HOPELESS: 0
SUM OF ALL RESPONSES TO PHQ QUESTIONS 1-9: 0
SUM OF ALL RESPONSES TO PHQ9 QUESTIONS 1 & 2: 0
1. LITTLE INTEREST OR PLEASURE IN DOING THINGS: 0
SUM OF ALL RESPONSES TO PHQ QUESTIONS 1-9: 0

## 2022-11-30 ASSESSMENT — SOCIAL DETERMINANTS OF HEALTH (SDOH): HOW HARD IS IT FOR YOU TO PAY FOR THE VERY BASICS LIKE FOOD, HOUSING, MEDICAL CARE, AND HEATING?: NOT HARD AT ALL

## 2022-11-30 NOTE — PROGRESS NOTES
daily persistent headache 10/11/2016    Oropharyngeal dysphagia 3/25/2020       Past Surgical History:        Procedure Laterality Date    HERNIA REPAIR      UPPER GASTROINTESTINAL ENDOSCOPY N/A 3/24/2020    EGD DILATION BALLOON performed by Primitivo Amato MD at [address] [place] [address] N/A 3/24/2020    EGD BIOPSY performed by Primitivo Amato MD at [facility] ENDOSCOPY       Family History:  Family History   Problem Relation Age of Onset    Depression Mother     Cancer Paternal Uncle     Cancer Maternal Grandmother     Cancer Maternal Grandfather     Cancer Paternal Grandmother     Cancer Paternal Grandfather     Diabetes Other        Social History:  Social History     Socioeconomic History    Marital status: Single     Spouse name: None    Number of children: None    Years of education: None    Highest education level: None   Tobacco Use    Smoking status: Never    Smokeless tobacco: Never   Vaping Use    Vaping Use: Never used   Substance and Sexual Activity    Alcohol use: Yes     Alcohol/week: 5.0 standard drinks     Types: 5 Cans of beer per week     Comment: 2 drinks daily     Drug use: No     Social Determinants of Health     Financial Resource Strain: Low Risk     Difficulty of Paying Living Expenses: Not hard at all   Food Insecurity: No Food Insecurity    Worried About Running Out of Food in the Last Year: Never true    Ran Out of Food in the Last Year: Never true         Allergies:  Patient has no known allergies. Current Medications:    Prior to Admission medications    Medication Sig Start Date End Date Taking?  Authorizing Provider   omeprazole (PRILOSEC) 40 MG delayed release capsule Take 40 mg by mouth daily   Yes Historical Provider, MD           Physical Exam:      Constitutional:  Well developed, well nourished, no acute distress, non-toxic appearance   Eyes:  PERRL, conjunctiva normal   HENT:  Atraumatic, external ears normal, nose normal, oropharynx moist, no pharyngeal exudates. Neck- normal range of motion, no tenderness, supple   Respiratory:  No respiratory distress, normal breath sounds, no rales, no wheezing   Cardiovascular:  Normal rate, normal rhythm, no murmurs, no gallops, no rubs   GI:  Soft, nondistended, normal bowel sounds, nontender, no organomegaly, no mass, no rebound, no guarding   :  No costovertebral angle tenderness   Musculoskeletal:  No edema, no tenderness, no deformities. Back- no tenderness  Integument:  Well hydrated, no rash   Lymphatic:  No lymphadenopathy noted   Neurologic:  Alert & oriented x 3, CN 2-12 normal, normal motor function, normal sensory function, no focal deficits noted   Psychiatric:  Speech and behavior appropriate       Vitals: /70 (Site: Left Upper Arm)   Ht 5' 10\" (1.778 m)   Wt 147 lb (66.7 kg)   BMI 21.09 kg/m²     Body mass index is 21.09 kg/m².   Wt Readings from Last 3 Encounters:   11/30/22 147 lb (66.7 kg)   11/12/21 142 lb (64.4 kg)   10/19/21 144 lb (65.3 kg)         LABS:    CBC:   Lab Results   Component Value Date    WBC 4.8 11/17/2022    HGB 14.8 11/17/2022    HCT 43.4 11/17/2022    MCV 88.1 11/17/2022     11/17/2022           Lab Results   Component Value Date    TGIKYCQQ91 697 12/09/2016                                                             BMP:    Lab Results   Component Value Date     (L) 11/17/2022    K 3.9 11/17/2022    CL 92 (L) 11/17/2022    CO2 24 11/17/2022       LFT's:   Lab Results   Component Value Date    ALT 17 11/17/2022    AST 17 11/17/2022    ALKPHOS 68 11/17/2022    BILITOT 0.6 11/17/2022       Lipids:   Lab Results   Component Value Date    CHOL 161 11/17/2022    HDL 42 11/17/2022    LDLCALC 103 (H) 11/17/2022    TRIG 82 11/17/2022       INR: No results found for: INR, PROTIME    U/A:  Lab Results   Component Value Date    LABMICR Not Indicated 11/17/2022        No results found for: LABA1C     Lab Results   Component Value Date    CREATININE 0.7 (L) 11/17/2022 -----------------------------------------------------------------     Assessment/Plan:   Eosinophilic esophagitis this problem is stable on present meds    2. Blood pressure elevated without history of HTN  Problem is stable continue to monitor continue diet and exercise    3.  Migraine without aura and without status migrainosus, not intractable  Problem is stable and current over-the-counter medicine  Physical exam check screening labs continue diet and exercise he is up-to-date on his immunizations we will follow-up in 1 year

## 2023-05-30 DIAGNOSIS — R07.9 CHEST PAIN, UNSPECIFIED TYPE: ICD-10-CM

## 2023-05-30 RX ORDER — LORAZEPAM 0.5 MG/1
0.5 TABLET ORAL EVERY 8 HOURS PRN
Qty: 21 TABLET | Refills: 0 | Status: SHIPPED | OUTPATIENT
Start: 2023-05-30 | End: 2023-06-06

## 2023-05-30 NOTE — TELEPHONE ENCOUNTER
Lorazepam Refill  (Newest Message First)  [place] [address] Support (supporting Lakisha Aguilar MD) 4 days ago     MAURY Navarrosofy Altamiranos been prescribed lorazepam 0.5 mg tablets in the past, with the last prescription coming in October 2021 (Qty:20). I was hoping to receive a refill prescription.      Thank you,     Liban Hedrick

## 2023-11-14 ENCOUNTER — TELEPHONE (OUTPATIENT)
Dept: INTERNAL MEDICINE CLINIC | Age: 38
End: 2023-11-14

## 2023-11-14 DIAGNOSIS — Z00.00 PE (PHYSICAL EXAM), ANNUAL: Primary | ICD-10-CM

## 2023-11-14 NOTE — TELEPHONE ENCOUNTER
----- Message from Joshua Almaraz sent at 11/14/2023 11:22 AM EST -----  Subject: Referral Request    Reason for referral request? requesting blood work orders and urinaysis   for physical appointment 12/4. please call patient when orders are placed   so he can get bw and urine done prior to appointment. Provider patient wants to be referred to(if known):     Provider Phone Number(if known):     Additional Information for Provider?   ---------------------------------------------------------------------------  --------------  Amelia [place]    [phone]; OK to leave message on voicemail  ---------------------------------------------------------------------------  --------------

## 2023-11-16 DIAGNOSIS — Z00.00 PE (PHYSICAL EXAM), ANNUAL: ICD-10-CM

## 2023-11-16 LAB
ALBUMIN SERPL-MCNC: 4.3 G/DL (ref 3.4–5)
ALBUMIN/GLOB SERPL: 2.2 {RATIO} (ref 1.1–2.2)
ALP SERPL-CCNC: 65 U/L (ref 40–129)
ALT SERPL-CCNC: 16 U/L (ref 10–40)
ANION GAP SERPL CALCULATED.3IONS-SCNC: 8 MMOL/L (ref 3–16)
AST SERPL-CCNC: 17 U/L (ref 15–37)
BASOPHILS # BLD: 0 K/UL (ref 0–0.2)
BASOPHILS NFR BLD: 0.4 %
BILIRUB SERPL-MCNC: 0.7 MG/DL (ref 0–1)
BILIRUB UR QL STRIP.AUTO: NEGATIVE
BUN SERPL-MCNC: 10 MG/DL (ref 7–20)
CALCIUM SERPL-MCNC: 9.4 MG/DL (ref 8.3–10.6)
CHLORIDE SERPL-SCNC: 96 MMOL/L (ref 99–110)
CHOLEST SERPL-MCNC: 178 MG/DL (ref 0–199)
CLARITY UR: CLEAR
CO2 SERPL-SCNC: 27 MMOL/L (ref 21–32)
COLOR UR: YELLOW
CREAT SERPL-MCNC: 0.8 MG/DL (ref 0.9–1.3)
DEPRECATED RDW RBC AUTO: 12.7 % (ref 12.4–15.4)
EOSINOPHIL # BLD: 0.1 K/UL (ref 0–0.6)
EOSINOPHIL NFR BLD: 1.3 %
GFR SERPLBLD CREATININE-BSD FMLA CKD-EPI: >60 ML/MIN/{1.73_M2}
GLUCOSE SERPL-MCNC: 88 MG/DL (ref 70–99)
GLUCOSE UR STRIP.AUTO-MCNC: NEGATIVE MG/DL
HCT VFR BLD AUTO: 43 % (ref 40.5–52.5)
HDLC SERPL-MCNC: 46 MG/DL (ref 40–60)
HGB BLD-MCNC: 14.9 G/DL (ref 13.5–17.5)
HGB UR QL STRIP.AUTO: NEGATIVE
KETONES UR STRIP.AUTO-MCNC: ABNORMAL MG/DL
LDLC SERPL CALC-MCNC: 120 MG/DL
LEUKOCYTE ESTERASE UR QL STRIP.AUTO: NEGATIVE
LYMPHOCYTES # BLD: 1.6 K/UL (ref 1–5.1)
LYMPHOCYTES NFR BLD: 35.5 %
MCH RBC QN AUTO: 30 PG (ref 26–34)
MCHC RBC AUTO-ENTMCNC: 34.6 G/DL (ref 31–36)
MCV RBC AUTO: 86.8 FL (ref 80–100)
MONOCYTES # BLD: 0.5 K/UL (ref 0–1.3)
MONOCYTES NFR BLD: 10 %
NEUTROPHILS # BLD: 2.4 K/UL (ref 1.7–7.7)
NEUTROPHILS NFR BLD: 52.8 %
NITRITE UR QL STRIP.AUTO: NEGATIVE
PH UR STRIP.AUTO: 6.5 [PH] (ref 5–8)
PLATELET # BLD AUTO: 254 K/UL (ref 135–450)
PMV BLD AUTO: 8.5 FL (ref 5–10.5)
POTASSIUM SERPL-SCNC: 3.7 MMOL/L (ref 3.5–5.1)
PROT SERPL-MCNC: 6.3 G/DL (ref 6.4–8.2)
PROT UR STRIP.AUTO-MCNC: NEGATIVE MG/DL
RBC # BLD AUTO: 4.95 M/UL (ref 4.2–5.9)
SODIUM SERPL-SCNC: 131 MMOL/L (ref 136–145)
SP GR UR STRIP.AUTO: 1.01 (ref 1–1.03)
TRIGL SERPL-MCNC: 59 MG/DL (ref 0–150)
TSH SERPL DL<=0.005 MIU/L-ACNC: 1.6 UIU/ML (ref 0.27–4.2)
UA DIPSTICK W REFLEX MICRO PNL UR: ABNORMAL
URN SPEC COLLECT METH UR: ABNORMAL
UROBILINOGEN UR STRIP-ACNC: 0.2 E.U./DL
VLDLC SERPL CALC-MCNC: 12 MG/DL
WBC # BLD AUTO: 4.6 K/UL (ref 4–11)

## 2023-11-17 DIAGNOSIS — E87.1 LOW SODIUM LEVELS: Primary | ICD-10-CM

## 2023-12-04 ENCOUNTER — OFFICE VISIT (OUTPATIENT)
Dept: INTERNAL MEDICINE CLINIC | Age: 38
End: 2023-12-04
Payer: COMMERCIAL

## 2023-12-04 VITALS
HEIGHT: 71 IN | WEIGHT: 151 LBS | OXYGEN SATURATION: 99 % | HEART RATE: 94 BPM | BODY MASS INDEX: 21.14 KG/M2 | SYSTOLIC BLOOD PRESSURE: 138 MMHG | DIASTOLIC BLOOD PRESSURE: 72 MMHG

## 2023-12-04 DIAGNOSIS — Z00.00 PE (PHYSICAL EXAM), ANNUAL: Primary | ICD-10-CM

## 2023-12-04 DIAGNOSIS — G43.009 MIGRAINE WITHOUT AURA AND WITHOUT STATUS MIGRAINOSUS, NOT INTRACTABLE: ICD-10-CM

## 2023-12-04 DIAGNOSIS — J30.2 SEASONAL ALLERGIES: ICD-10-CM

## 2023-12-04 DIAGNOSIS — K21.00 GASTROESOPHAGEAL REFLUX DISEASE WITH ESOPHAGITIS WITHOUT HEMORRHAGE: ICD-10-CM

## 2023-12-04 DIAGNOSIS — E87.1 HYPONATREMIA: ICD-10-CM

## 2023-12-04 PROCEDURE — 99395 PREV VISIT EST AGE 18-39: CPT | Performed by: INTERNAL MEDICINE

## 2023-12-04 RX ORDER — OMEPRAZOLE 20 MG/1
20 CAPSULE, DELAYED RELEASE ORAL DAILY
Qty: 30 CAPSULE | Refills: 5 | Status: SHIPPED
Start: 2023-12-04

## 2023-12-04 RX ORDER — CETIRIZINE HYDROCHLORIDE 10 MG/1
10 TABLET ORAL DAILY
COMMUNITY

## 2023-12-04 SDOH — ECONOMIC STABILITY: INCOME INSECURITY: HOW HARD IS IT FOR YOU TO PAY FOR THE VERY BASICS LIKE FOOD, HOUSING, MEDICAL CARE, AND HEATING?: NOT HARD AT ALL

## 2023-12-04 SDOH — ECONOMIC STABILITY: HOUSING INSECURITY
IN THE LAST 12 MONTHS, WAS THERE A TIME WHEN YOU DID NOT HAVE A STEADY PLACE TO SLEEP OR SLEPT IN A SHELTER (INCLUDING NOW)?: NO

## 2023-12-04 SDOH — ECONOMIC STABILITY: FOOD INSECURITY: WITHIN THE PAST 12 MONTHS, YOU WORRIED THAT YOUR FOOD WOULD RUN OUT BEFORE YOU GOT MONEY TO BUY MORE.: NEVER TRUE

## 2023-12-04 SDOH — ECONOMIC STABILITY: FOOD INSECURITY: WITHIN THE PAST 12 MONTHS, THE FOOD YOU BOUGHT JUST DIDN'T LAST AND YOU DIDN'T HAVE MONEY TO GET MORE.: NEVER TRUE

## 2023-12-04 ASSESSMENT — PATIENT HEALTH QUESTIONNAIRE - PHQ9
SUM OF ALL RESPONSES TO PHQ QUESTIONS 1-9: 0
SUM OF ALL RESPONSES TO PHQ9 QUESTIONS 1 & 2: 0
SUM OF ALL RESPONSES TO PHQ QUESTIONS 1-9: 0
2. FEELING DOWN, DEPRESSED OR HOPELESS: 0
1. LITTLE INTEREST OR PLEASURE IN DOING THINGS: 0
SUM OF ALL RESPONSES TO PHQ QUESTIONS 1-9: 0
SUM OF ALL RESPONSES TO PHQ QUESTIONS 1-9: 0

## 2023-12-21 DIAGNOSIS — Z00.00 PE (PHYSICAL EXAM), ANNUAL: ICD-10-CM

## 2023-12-21 DIAGNOSIS — E87.1 HYPONATREMIA: ICD-10-CM

## 2023-12-21 LAB — HCV AB SERPL QL IA: NORMAL

## 2023-12-22 LAB
ALBUMIN SERPL-MCNC: 4.4 G/DL (ref 3.4–5)
ANION GAP SERPL CALCULATED.3IONS-SCNC: 9 MMOL/L (ref 3–16)
BUN SERPL-MCNC: 12 MG/DL (ref 7–20)
CALCIUM SERPL-MCNC: 9.1 MG/DL (ref 8.3–10.6)
CHLORIDE SERPL-SCNC: 100 MMOL/L (ref 99–110)
CO2 SERPL-SCNC: 28 MMOL/L (ref 21–32)
CREAT SERPL-MCNC: 0.8 MG/DL (ref 0.9–1.3)
GFR SERPLBLD CREATININE-BSD FMLA CKD-EPI: >60 ML/MIN/{1.73_M2}
GLUCOSE SERPL-MCNC: 90 MG/DL (ref 70–99)
HIV 1+2 AB+HIV1 P24 AG SERPL QL IA: NORMAL
HIV 2 AB SERPL QL IA: NORMAL
HIV1 AB SERPL QL IA: NORMAL
HIV1 P24 AG SERPL QL IA: NORMAL
PHOSPHATE SERPL-MCNC: 2.8 MG/DL (ref 2.5–4.9)
POTASSIUM SERPL-SCNC: 3.9 MMOL/L (ref 3.5–5.1)
SODIUM SERPL-SCNC: 137 MMOL/L (ref 136–145)

## 2024-04-30 LAB
CHOLEST SERPL-MCNC: 159 MG/DL (ref 0–199)
GLUCOSE SERPL-MCNC: 88 MG/DL (ref 70–99)
HDLC SERPL-MCNC: 45 MG/DL (ref 40–60)
LDLC SERPL CALC-MCNC: 98 MG/DL
TRIGL SERPL-MCNC: 81 MG/DL (ref 0–150)

## 2024-07-31 ENCOUNTER — TELEPHONE (OUTPATIENT)
Dept: INTERNAL MEDICINE CLINIC | Age: 39
End: 2024-07-31

## 2024-07-31 ENCOUNTER — PATIENT MESSAGE (OUTPATIENT)
Dept: INTERNAL MEDICINE CLINIC | Age: 39
End: 2024-07-31

## 2024-07-31 NOTE — TELEPHONE ENCOUNTER
Lorazepam Refill  (Newest Message First)  View All Conversations on this Encounter  You52 minutes ago (3:09 PM)     MW  From: Binh Barnard  To: Dr. Andrea Ruby  Sent: 7/31/2024  2:07 PM EDT  Subject: Lorazepam Refill    Hello - I’ve been prescribed lorazepam 0.5 mg tablets in the past, with the last prescription coming in May 2023 (Qty:20). I was hoping to receive a refill prescription.    Thank you,    Jose Francisco         Note

## 2024-07-31 NOTE — TELEPHONE ENCOUNTER
From: Binh Barnard  To: Dr. Andrea Ruby  Sent: 7/31/2024 2:07 PM EDT  Subject: Lorazepam Refill     Hello - I’ve been prescribed lorazepam 0.5 mg tablets in the past, with the last prescription coming in May 2023 (Qty:20). I was hoping to receive a refill prescription.     Thank you,     Jose Francisco

## 2024-09-17 ENCOUNTER — TELEMEDICINE (OUTPATIENT)
Dept: INTERNAL MEDICINE CLINIC | Age: 39
End: 2024-09-17

## 2024-09-17 DIAGNOSIS — F41.9 ANXIETY: ICD-10-CM

## 2024-09-17 RX ORDER — LORAZEPAM 0.5 MG/1
0.5 TABLET ORAL EVERY 8 HOURS PRN
Qty: 30 TABLET | Refills: 0 | Status: SHIPPED | OUTPATIENT
Start: 2024-09-17 | End: 2024-09-24

## 2024-09-17 RX ORDER — LORAZEPAM 0.5 MG/1
0.5 TABLET ORAL EVERY 8 HOURS PRN
Qty: 21 TABLET | Refills: 0 | Status: CANCELLED | OUTPATIENT
Start: 2024-09-17 | End: 2024-09-24

## 2024-09-17 ASSESSMENT — PATIENT HEALTH QUESTIONNAIRE - PHQ9
SUM OF ALL RESPONSES TO PHQ QUESTIONS 1-9: 0
2. FEELING DOWN, DEPRESSED OR HOPELESS: NOT AT ALL
SUM OF ALL RESPONSES TO PHQ QUESTIONS 1-9: 0
SUM OF ALL RESPONSES TO PHQ9 QUESTIONS 1 & 2: 0
1. LITTLE INTEREST OR PLEASURE IN DOING THINGS: NOT AT ALL

## 2024-11-25 ENCOUNTER — TELEPHONE (OUTPATIENT)
Dept: INTERNAL MEDICINE CLINIC | Age: 39
End: 2024-11-25

## 2024-11-25 NOTE — TELEPHONE ENCOUNTER
good morning - I have an upcoming annual physical on Dec. 10th. Could you please order the bloodwork and urinalysis for this appointment now so I can get it done ahead of time?  Thanks, Jose Francisco

## 2024-12-03 ENCOUNTER — TELEPHONE (OUTPATIENT)
Dept: INTERNAL MEDICINE CLINIC | Age: 39
End: 2024-12-03

## 2024-12-03 DIAGNOSIS — Z00.00 WELL ADULT EXAM: Primary | ICD-10-CM

## 2024-12-05 DIAGNOSIS — Z00.00 WELL ADULT EXAM: ICD-10-CM

## 2024-12-05 LAB
ALBUMIN SERPL-MCNC: 4.4 G/DL (ref 3.4–5)
ALBUMIN/GLOB SERPL: 2.1 {RATIO} (ref 1.1–2.2)
ALP SERPL-CCNC: 62 U/L (ref 40–129)
ALT SERPL-CCNC: 24 U/L (ref 10–40)
ANION GAP SERPL CALCULATED.3IONS-SCNC: 11 MMOL/L (ref 3–16)
AST SERPL-CCNC: 22 U/L (ref 15–37)
BASOPHILS # BLD: 0 K/UL (ref 0–0.2)
BASOPHILS NFR BLD: 0.4 %
BILIRUB SERPL-MCNC: 0.5 MG/DL (ref 0–1)
BILIRUB UR QL STRIP.AUTO: NEGATIVE
BUN SERPL-MCNC: 12 MG/DL (ref 7–20)
CALCIUM SERPL-MCNC: 9.5 MG/DL (ref 8.3–10.6)
CHLORIDE SERPL-SCNC: 100 MMOL/L (ref 99–110)
CHOLEST SERPL-MCNC: 194 MG/DL (ref 0–199)
CLARITY UR: CLEAR
CO2 SERPL-SCNC: 26 MMOL/L (ref 21–32)
COLOR UR: YELLOW
CREAT SERPL-MCNC: 0.8 MG/DL (ref 0.9–1.3)
DEPRECATED RDW RBC AUTO: 13 % (ref 12.4–15.4)
EOSINOPHIL # BLD: 0.1 K/UL (ref 0–0.6)
EOSINOPHIL NFR BLD: 1.3 %
GFR SERPLBLD CREATININE-BSD FMLA CKD-EPI: >90 ML/MIN/{1.73_M2}
GLUCOSE SERPL-MCNC: 89 MG/DL (ref 70–99)
GLUCOSE UR STRIP.AUTO-MCNC: NEGATIVE MG/DL
HCT VFR BLD AUTO: 44.1 % (ref 40.5–52.5)
HDLC SERPL-MCNC: 48 MG/DL (ref 40–60)
HGB BLD-MCNC: 14.6 G/DL (ref 13.5–17.5)
HGB UR QL STRIP.AUTO: NEGATIVE
KETONES UR STRIP.AUTO-MCNC: NEGATIVE MG/DL
LDLC SERPL CALC-MCNC: 135 MG/DL
LEUKOCYTE ESTERASE UR QL STRIP.AUTO: NEGATIVE
LYMPHOCYTES # BLD: 2 K/UL (ref 1–5.1)
LYMPHOCYTES NFR BLD: 36.1 %
MCH RBC QN AUTO: 29.3 PG (ref 26–34)
MCHC RBC AUTO-ENTMCNC: 33.2 G/DL (ref 31–36)
MCV RBC AUTO: 88.2 FL (ref 80–100)
MONOCYTES # BLD: 0.5 K/UL (ref 0–1.3)
MONOCYTES NFR BLD: 8.8 %
NEUTROPHILS # BLD: 3 K/UL (ref 1.7–7.7)
NEUTROPHILS NFR BLD: 53.4 %
NITRITE UR QL STRIP.AUTO: NEGATIVE
PH UR STRIP.AUTO: 7 [PH] (ref 5–8)
PLATELET # BLD AUTO: 239 K/UL (ref 135–450)
PMV BLD AUTO: 8.6 FL (ref 5–10.5)
POTASSIUM SERPL-SCNC: 4 MMOL/L (ref 3.5–5.1)
PROT SERPL-MCNC: 6.5 G/DL (ref 6.4–8.2)
PROT UR STRIP.AUTO-MCNC: NEGATIVE MG/DL
RBC # BLD AUTO: 4.99 M/UL (ref 4.2–5.9)
SODIUM SERPL-SCNC: 137 MMOL/L (ref 136–145)
SP GR UR STRIP.AUTO: 1.02 (ref 1–1.03)
TRIGL SERPL-MCNC: 55 MG/DL (ref 0–150)
TSH SERPL DL<=0.005 MIU/L-ACNC: 1.92 UIU/ML (ref 0.27–4.2)
UA DIPSTICK W REFLEX MICRO PNL UR: NORMAL
URN SPEC COLLECT METH UR: NORMAL
UROBILINOGEN UR STRIP-ACNC: 0.2 E.U./DL
VLDLC SERPL CALC-MCNC: 11 MG/DL
WBC # BLD AUTO: 5.7 K/UL (ref 4–11)

## 2024-12-10 ENCOUNTER — OFFICE VISIT (OUTPATIENT)
Dept: INTERNAL MEDICINE CLINIC | Age: 39
End: 2024-12-10

## 2024-12-10 VITALS
WEIGHT: 145 LBS | BODY MASS INDEX: 20.76 KG/M2 | OXYGEN SATURATION: 99 % | HEIGHT: 70 IN | SYSTOLIC BLOOD PRESSURE: 130 MMHG | HEART RATE: 87 BPM | DIASTOLIC BLOOD PRESSURE: 80 MMHG

## 2024-12-10 DIAGNOSIS — R03.0 BLOOD PRESSURE ELEVATED WITHOUT HISTORY OF HTN: ICD-10-CM

## 2024-12-10 DIAGNOSIS — K20.0 EOSINOPHILIC ESOPHAGITIS: Primary | ICD-10-CM

## 2024-12-10 DIAGNOSIS — F41.9 ANXIETY: ICD-10-CM

## 2024-12-10 DIAGNOSIS — J30.2 SEASONAL ALLERGIES: ICD-10-CM

## 2024-12-10 PROBLEM — E87.1 HYPONATREMIA: Status: RESOLVED | Noted: 2023-12-04 | Resolved: 2024-12-10

## 2024-12-10 RX ORDER — LORAZEPAM 0.5 MG/1
0.5 TABLET ORAL EVERY 8 HOURS PRN
COMMUNITY

## 2024-12-10 SDOH — ECONOMIC STABILITY: FOOD INSECURITY: WITHIN THE PAST 12 MONTHS, THE FOOD YOU BOUGHT JUST DIDN'T LAST AND YOU DIDN'T HAVE MONEY TO GET MORE.: NEVER TRUE

## 2024-12-10 SDOH — ECONOMIC STABILITY: FOOD INSECURITY: WITHIN THE PAST 12 MONTHS, YOU WORRIED THAT YOUR FOOD WOULD RUN OUT BEFORE YOU GOT MONEY TO BUY MORE.: NEVER TRUE

## 2024-12-10 SDOH — ECONOMIC STABILITY: INCOME INSECURITY: HOW HARD IS IT FOR YOU TO PAY FOR THE VERY BASICS LIKE FOOD, HOUSING, MEDICAL CARE, AND HEATING?: NOT HARD AT ALL

## 2024-12-10 ASSESSMENT — PATIENT HEALTH QUESTIONNAIRE - PHQ9
1. LITTLE INTEREST OR PLEASURE IN DOING THINGS: NOT AT ALL
2. FEELING DOWN, DEPRESSED OR HOPELESS: NOT AT ALL
SUM OF ALL RESPONSES TO PHQ QUESTIONS 1-9: 0
SUM OF ALL RESPONSES TO PHQ9 QUESTIONS 1 & 2: 0
SUM OF ALL RESPONSES TO PHQ QUESTIONS 1-9: 0

## 2024-12-10 NOTE — PROGRESS NOTES
Annual Wellness Visit     Patient:  Binh Barnard                                               : 1985  Age: 39 y.o.  MRN: [id number]  Date : 12/10/2024      CHIEF COMPLAINT: Binh Barnard is a 39 y.o. male who presents for : Physical exam    1. Eosinophilic esophagitis  This problem is stable on omeprazole    2. Blood pressure elevated without history of HTN  3 of whitecoat hypertension but blood pressures been good at home    3. Anxiety  This problem is stable he uses Ativan maybe once a month    4. Seasonal allergies  This problem is stable with over-the-counter Zyrtec  Physical exam generally feels good denies any chest pain shortness of breath or any other problem      Patient Active Problem List    Diagnosis Date Noted    Anxiety 11/15/2021    Eosinophilic esophagitis 07/15/2020    Seasonal allergies 2020    Multiple benign melanocytic nevi 2020    Migraine without aura and without status migrainosus, not intractable 2017    Blood pressure elevated without history of HTN 2017       Constitutional:  Denies fever or chills   Eyes:  Denies change in visual acuity   HENT:  Denies nasal congestion or sore throat   Respiratory:  Denies cough or shortness of breath   Cardiovascular:  Denies chest pain or edema   GI:  Denies abdominal pain, nausea, vomiting, bloody stools or diarrhea   :  Denies dysuria   Musculoskeletal:  Denies back pain or joint pain   Integument:  Denies rash   Neurologic:  Denies headache, focal weakness or sensory changes   Endocrine:  Denies polyuria or polydipsia   Lymphatic:  Denies swollen glands   Psychiatric:  Denies depression or anxiety     Past Medical History:        Diagnosis Date    Anxiety 11/15/2021    Blood pressure elevated without history of HTN 2017    Eosinophilic esophagitis 7/15/2020    Migraine without aura and without status migrainosus, not intractable 2017    New daily persistent headache 10/11/2016    Oropharyngeal

## 2025-05-09 LAB
CHOLEST SERPL-MCNC: 169 MG/DL (ref 0–199)
GLUCOSE SERPL-MCNC: 92 MG/DL (ref 70–99)
HDLC SERPL-MCNC: 39 MG/DL (ref 40–60)
LDLC SERPL CALC-MCNC: 115 MG/DL
TRIGL SERPL-MCNC: 74 MG/DL (ref 0–150)

## 2025-07-17 NOTE — PROGRESS NOTES
Zachary Jacob is a 67 y.o. year old male Established patient, here for evaluation of the following chief complaint(s):    Chief Complaint   Patient presents with    Follow-up     Ed visit for palpitations            Assessment & Plan  1. Palpitations.  - Irregular heartbeats over the past month, skipped beats every four to five beats.  - SOB in hot weather, possibly due to dehydration.  - History of atrial fibrillation, currently on carvedilol.  - Continue follow-up with cardiologist, likely monitor placement.    2. Elevated blood glucose.  - Consistently elevated blood glucose.  - Hemoglobin A1c test today for long-term glucose control.    3. Early COPD.  - Stable lung nodules on CT scan done at [facility], reviewed today  - Diagnosed with early COPD or emphysema per PFT done at [facility] which was reviewed today. Strongly advised to quit smoking.  - Prefers to avoid medication options.    Follow-up  - Follow up in October 2025.    Diagnoses and all orders for this visit:  Heart palpitations  Impaired glucose tolerance  -     Hemoglobin A1C  COPD, mild (HCC)       Patient/guardian was given the opportunity to ask questions regarding the visit today.  Questions were addressed as applicable.  They verbalized comfort and understanding of the assessment and plan.     Return if symptoms worsen or fail to improve, for Has appt in Oct for AWV.    SUBJECTIVE/OBJECTIVE:    History of Present Illness  67-year-old male presents for ER follow-up for heart palpitations.    Heart Palpitations  - Experiencing irregular heartbeats for the past month, with increased frequency.  - Describes heart skipping a beat every four or five beats, then normal for a few hours before repeating.  - History of atrial fibrillation but does not believe current issue is related.  - seen at ER - normal ECG.  Has hx of normal stress test done 1 year ago.  - see cardiology later today.  - gets very anxious when these happen.  - taking coreg  Annual Wellness Visit     Patient:  Zhen Gutierrez                                               : 1985  Age: 33 y.o. MRN: [id number]  Date : 2017      CHIEF COMPLAINT: Zhen Gutierrez is a 33 y.o. male who presents for : Physical exam    1. Migraine without aura and without status migrainosus, not intractable  This problem is controlled on magnesium and coenzyme Q    2. Blood pressure elevated without history of HTN  Blood pressures been well-controlled at home    3. Need for Tdap vaccination    - Tdap (age 10y-63y) IM ([facility])        Patient Active Problem List    Diagnosis Date Noted    Migraine without aura and without status migrainosus, not intractable 2017    Blood pressure elevated without history of HTN 2017       Constitutional:  Denies fever or chills   Eyes:  Denies change in visual acuity   HENT:  Denies nasal congestion or sore throat   Respiratory:  Denies cough or shortness of breath   Cardiovascular:  Denies chest pain or edema   GI:  Denies abdominal pain, nausea, vomiting, bloody stools or diarrhea   :  Denies dysuria   Musculoskeletal:  Denies back pain or joint pain   Integument:  Denies rash   Neurologic:  Denies headache, focal weakness or sensory changes   Endocrine:  Denies polyuria or polydipsia   Lymphatic:  Denies swollen glands   Psychiatric:  Denies depression or anxiety     Past Medical History:        Diagnosis Date    Blood pressure elevated without history of HTN 2017    Migraine without aura and without status migrainosus, not intractable 2017    New daily persistent headache 10/11/2016       Past Surgical History:        Procedure Laterality Date    HERNIA REPAIR           Allergies:  Review of patient's allergies indicates no known allergies. Current Medications:    Prior to Admission medications    Medication Sig Start Date End Date Taking?  Authorizing Provider   Magnesium 400 MG TABS Take by mouth   Yes Historical Provider, Results   Component Value Date    ALT 14 11/16/2017    AST 15 11/16/2017    ALKPHOS 53 11/16/2017    BILITOT 0.6 11/16/2017       Lipids:   Lab Results   Component Value Date    CHOL 162 11/16/2017    HDL 49 11/16/2017    LDLCALC 97 11/16/2017    TRIG 78 11/16/2017       INR: No results found for: INR, PROTIME    U/A:  Lab Results   Component Value Date    LABMICR Not Indicated 11/16/2017        No results found for: LABA1C     Lab Results   Component Value Date    CREATININE 0.7 (L) 11/16/2017       -----------------------------------------------------------------     Assessment/Plan:   1. Migraine without aura and without status migrainosus, not intractable  This problem is stable on current meds    2. Blood pressure elevated without history of HTN  Probable white coat hypertension is to check his blood pressure 3 times at home and stay off salt    3.  Need for Tdap vaccination  T dap given  - Tdap (age 10y-63y) IM ([facility])   Physical exam check screening labs continue present meds for now

## (undated) DEVICE — SYRINGE INFL 60ML DISP ALLIANCE II

## (undated) DEVICE — ESOPHAGEAL BALLOON DILATATION CATHETER: Brand: CRE FIXED WIRE

## (undated) DEVICE — FORCEPS BX L240CM JAW DIA2.4MM ORNG L CAP W/ NDL DISP RAD